# Patient Record
Sex: FEMALE | Race: WHITE | NOT HISPANIC OR LATINO | Employment: PART TIME | URBAN - METROPOLITAN AREA
[De-identification: names, ages, dates, MRNs, and addresses within clinical notes are randomized per-mention and may not be internally consistent; named-entity substitution may affect disease eponyms.]

---

## 2017-05-31 ENCOUNTER — ALLSCRIPTS OFFICE VISIT (OUTPATIENT)
Dept: OTHER | Facility: OTHER | Age: 27
End: 2017-05-31

## 2017-06-07 ENCOUNTER — ALLSCRIPTS OFFICE VISIT (OUTPATIENT)
Dept: OTHER | Facility: OTHER | Age: 27
End: 2017-06-07

## 2017-07-17 ENCOUNTER — GENERIC CONVERSION - ENCOUNTER (OUTPATIENT)
Dept: OTHER | Facility: OTHER | Age: 27
End: 2017-07-17

## 2017-07-17 DIAGNOSIS — Z23 ENCOUNTER FOR IMMUNIZATION: ICD-10-CM

## 2017-08-16 ENCOUNTER — LAB CONVERSION - ENCOUNTER (OUTPATIENT)
Dept: OTHER | Facility: OTHER | Age: 27
End: 2017-08-16

## 2017-08-16 LAB
HEPATITIS B CORE TOTAL ANTIBODY (HISTORICAL): ABNORMAL
HEPATITIS B SURFACE ANTIBODY (HISTORICAL): ABNORMAL
MUMPS IGG ANTIBODY (HISTORICAL): 65.1 AU/ML
RUBELLA, IGG (HISTORICAL): 2.98 INDEX
RUBEOLA AB, IGG (HISTORICAL): 38.7 AU/ML
VARICELLA ZOSTER IGG (HISTORICAL): 708.9 INDEX

## 2017-08-20 ENCOUNTER — GENERIC CONVERSION - ENCOUNTER (OUTPATIENT)
Dept: OTHER | Facility: OTHER | Age: 27
End: 2017-08-20

## 2017-08-25 ENCOUNTER — ALLSCRIPTS OFFICE VISIT (OUTPATIENT)
Dept: OTHER | Facility: OTHER | Age: 27
End: 2017-08-25

## 2017-09-28 ENCOUNTER — ALLSCRIPTS OFFICE VISIT (OUTPATIENT)
Dept: OTHER | Facility: OTHER | Age: 27
End: 2017-09-28

## 2017-09-28 DIAGNOSIS — Z00.00 ENCOUNTER FOR GENERAL ADULT MEDICAL EXAMINATION WITHOUT ABNORMAL FINDINGS: ICD-10-CM

## 2017-09-28 DIAGNOSIS — F98.8 OTHER SPECIFIED BEHAVIORAL AND EMOTIONAL DISORDERS WITH ONSET USUALLY OCCURRING IN CHILDHOOD AND ADOLESCENCE: ICD-10-CM

## 2017-09-28 DIAGNOSIS — R53.83 OTHER FATIGUE: ICD-10-CM

## 2017-09-28 DIAGNOSIS — R42 DIZZINESS AND GIDDINESS: ICD-10-CM

## 2017-10-30 ENCOUNTER — GENERIC CONVERSION - ENCOUNTER (OUTPATIENT)
Dept: OTHER | Facility: OTHER | Age: 27
End: 2017-10-30

## 2017-10-30 ENCOUNTER — LAB CONVERSION - ENCOUNTER (OUTPATIENT)
Dept: OTHER | Facility: OTHER | Age: 27
End: 2017-10-30

## 2017-10-30 LAB
A/G RATIO (HISTORICAL): 1.8 (CALC) (ref 1–2.5)
ALBUMIN SERPL BCP-MCNC: 4.4 G/DL (ref 3.6–5.1)
ALP SERPL-CCNC: 47 U/L (ref 33–115)
ALT SERPL W P-5'-P-CCNC: 10 U/L (ref 6–29)
AST SERPL W P-5'-P-CCNC: 13 U/L (ref 10–30)
BASOPHILS # BLD AUTO: 0.8 %
BASOPHILS # BLD AUTO: 41 CELLS/UL (ref 0–200)
BILIRUB SERPL-MCNC: 0.6 MG/DL (ref 0.2–1.2)
BUN SERPL-MCNC: 14 MG/DL (ref 7–25)
BUN/CREA RATIO (HISTORICAL): NORMAL (CALC) (ref 6–22)
CALCIUM SERPL-MCNC: 9.9 MG/DL (ref 8.6–10.2)
CHLORIDE SERPL-SCNC: 105 MMOL/L (ref 98–110)
CO2 SERPL-SCNC: 28 MMOL/L (ref 20–31)
CREAT SERPL-MCNC: 0.85 MG/DL (ref 0.5–1.1)
DEPRECATED RDW RBC AUTO: 13.3 % (ref 11–15)
EGFR AFRICAN AMERICAN (HISTORICAL): 107 ML/MIN/1.73M2
EGFR-AMERICAN CALC (HISTORICAL): 93 ML/MIN/1.73M2
EOSINOPHIL # BLD AUTO: 1 %
EOSINOPHIL # BLD AUTO: 51 CELLS/UL (ref 15–500)
GAMMA GLOBULIN (HISTORICAL): 2.5 G/DL (CALC) (ref 1.9–3.7)
GLUCOSE (HISTORICAL): 77 MG/DL (ref 65–99)
HCT VFR BLD AUTO: 38.1 % (ref 35–45)
HGB BLD-MCNC: 13 G/DL (ref 11.7–15.5)
LYME IGG/IGM AB (HISTORICAL): <0.9 INDEX
LYMPHOCYTES # BLD AUTO: 2254 CELLS/UL (ref 850–3900)
LYMPHOCYTES # BLD AUTO: 44.2 %
MCH RBC QN AUTO: 30 PG (ref 27–33)
MCHC RBC AUTO-ENTMCNC: 34.1 G/DL (ref 32–36)
MCV RBC AUTO: 87.8 FL (ref 80–100)
MONOCYTES # BLD AUTO: 367 CELLS/UL (ref 200–950)
MONOCYTES (HISTORICAL): 7.2 %
NEUTROPHILS # BLD AUTO: 2387 CELLS/UL (ref 1500–7800)
NEUTROPHILS # BLD AUTO: 46.8 %
PLATELET # BLD AUTO: 291 THOUSAND/UL (ref 140–400)
PMV BLD AUTO: 9.2 FL (ref 7.5–12.5)
POTASSIUM SERPL-SCNC: 4.4 MMOL/L (ref 3.5–5.3)
RBC # BLD AUTO: 4.34 MILLION/UL (ref 3.8–5.1)
SODIUM SERPL-SCNC: 140 MMOL/L (ref 135–146)
T4 FREE SERPL-MCNC: 1.5 NG/DL (ref 0.8–1.8)
TOTAL PROTEIN (HISTORICAL): 6.9 G/DL (ref 6.1–8.1)
TSH SERPL DL<=0.05 MIU/L-ACNC: 1.34 MIU/L
WBC # BLD AUTO: 5.1 THOUSAND/UL (ref 3.8–10.8)

## 2017-11-28 ENCOUNTER — ALLSCRIPTS OFFICE VISIT (OUTPATIENT)
Dept: OTHER | Facility: OTHER | Age: 27
End: 2017-11-28

## 2017-12-22 ENCOUNTER — ALLSCRIPTS OFFICE VISIT (OUTPATIENT)
Dept: OTHER | Facility: OTHER | Age: 27
End: 2017-12-22

## 2017-12-22 DIAGNOSIS — Z80.3 FAMILY HISTORY OF MALIGNANT NEOPLASM OF BREAST: ICD-10-CM

## 2017-12-22 DIAGNOSIS — Z12.31 ENCOUNTER FOR SCREENING MAMMOGRAM FOR MALIGNANT NEOPLASM OF BREAST: ICD-10-CM

## 2017-12-23 NOTE — PROGRESS NOTES
Assessment   1  Family history of malignant neoplasm of breast (V16 3) (Z80 3) : Family History, Paternal     Grandmother, Paternal Cousin   2  ADD (attention deficit disorder) without hyperactivity (314 00) (F98 8)    Plan   ADD (attention deficit disorder) without hyperactivity    · Amphetamine-Dextroamphet ER 20 MG Oral Capsule Extended Release 24    Hour; TAKE 2 CAPSULES DAILY  Breast cancer screening, FamHx: Family history of malignant neoplasm of breast    · * MAMMO SCREENING BILATERAL W CAD; Status:Hold For - Scheduling; Requested    for:[id number];     Discussion/Summary      - Patient is doing well on Adderall XR 20 mg 2 capsules once daily without side effects from medication  Patient given a prescription for one-month supply with postdated refills  given order for screening mammogram  There is strong family history of breast cancer  given information for gynecologist whom she saw back in 2014 to schedule appointment for gynecologic examination  The was counseled regarding risk factor reductions,-- prognosis  Possible side effects of new medications were reviewed with the patient/guardian today  The treatment plan was reviewed with the patient/guardian  The patient/guardian understands and agrees with the treatment plan      Chief Complaint   follow up on ADD    Patient is here today for follow up of chronic conditions described in HPI  History of Present Illness   Patient presents for follow-up of ADD  Patient has been taking Adderall XR 20 mg 2 capsules once daily  This has been effective for her  No side effects from medication  No weight loss, insomnia or tachycardia  She does not take this every day  Insurance will cover Vyvanse but not Adderall  Patient is willing to pack a for the prescription because it is effective and she does not want to try switching  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   Patient also questions as she does have strong family history of breast cancer at young age  She is due for gynecologic examination      Review of Systems        Constitutional: No fever, no chills, feels well, no tiredness, no recent weight gain or weight loss  Eyes: No complaints of eye pain, no red eyes, no eyesight problems, no discharge, no dry eyes, no itching of eyes  ENT: no complaints of earache, no loss of hearing, no nose bleeds, no nasal discharge, no sore throat, no hoarseness  Cardiovascular: No complaints of slow heart rate, no fast heart rate, no chest pain, no palpitations, no leg claudication, no lower extremity edema  Respiratory: No complaints of shortness of breath, no wheezing, no cough, no SOB on exertion, no orthopnea, no PND  Gastrointestinal: No complaints of abdominal pain, no constipation, no nausea or vomiting, no diarrhea, no bloody stools  Genitourinary: No complaints of dysuria, no incontinence, no pelvic pain, no dysmenorrhea, no vaginal discharge or bleeding  Musculoskeletal: No complaints of arthralgias, no myalgias, no joint swelling or stiffness, no limb pain or swelling  Integumentary: No complaints of skin rash or lesions, no itching, no skin wounds, no breast pain or lump  Neurological: No complaints of headache, no confusion, no convulsions, no numbness, no dizziness or fainting, no tingling, no limb weakness, no difficulty walking  Psychiatric: Not suicidal, no sleep disturbance, no anxiety or depression, no change in personality, no emotional problems  Endocrine: No complaints of proptosis, no hot flashes, no muscle weakness, no deepening of the voice, no feelings of weakness  Hematologic/Lymphatic: No complaints of swollen glands, no swollen glands in the neck, does not bleed easily, does not bruise easily  Active Problems   1  ADD (attention deficit disorder) without hyperactivity (314 00) (F98 8)   2  Dizziness (780 4) (R42)   3  Fatigue (780 79) (R53 83)   4  Immunity status testing (V72 61) (Z01 84)   5  Need for hepatitis B vaccination (V05 3) (Z23)   6  Need for MMR vaccine (V06 4) (Z23)   7  Need for varicella vaccine (V05 4) (Z23)   8  Screening for tuberculosis (V74 1) (Z11 1)    Past Medical History   1  History of Ankle Sprain (845 00)   2  History of Atrial premature complex (427 61) (I49 1)   3  History of Bacterial vaginosis (616 10,041 9) (N76 0,B96 89)   4  History of Breast pain (611 71) (N64 4)   5  Encounter for routine child health examination w/o abnormal findings (V20 2) (Z00 129)   6  History of acute pharyngitis (V12 69) (Z87 09)   7  History of acute pharyngitis (V12 69) (Z87 09)   8  History of long-term treatment with high-risk medication (V58 69) (Z92 29)   9  History of vaginitis (V13 29) (Z87 42)   10  History of viral gastroenteritis (V12 09) (Z86 19)   11  History of Screen for STD (sexually transmitted disease) (V74 5) (Z11 3)   12  History of Tinea versicolor (111 0) (B36 0)   13  History of Well woman exam with routine gynecological exam (V72 31) (Z01 419)     The active problems and past medical history were reviewed and updated today  Surgical History   1  Denied: History Of Prior Surgery    Family History   Mother    1  Family history of Family Health Status Of Mother - Good  Father    2  Family history of Family Health Status Of Father - Good  Paternal Grandmother    3  Family history of malignant neoplasm of breast (V16 3) (Z80 3)  Paternal Cousin    4  Family history of malignant neoplasm of breast (V16 3) (Z80 3)  Family History    5  Family history of malignant neoplasm of breast (V16 3) (Z80 3)    Social History    · Denied: History of Alcohol Use (History)   · Denied: History of Drug Use   · Exercise habits   · Never A Smoker   · Never    · No drug use  The social history was reviewed and updated today  The social history was reviewed and is unchanged  Current Meds    1   Amphetamine-Dextroamphet ER 20 MG Oral Capsule Extended Release 24 Hour; TAKE     2 CAPSULES DAILY; Therapy: [id number] to ([id number]); Last Rx:[id number] Ordered    Allergies   1  Penicillins   2  Sulfa Drugs    Vitals   Vital Signs    Recorded: 38Ehl9178 08:04AM   Heart Rate 62   Respiration 16   Systolic 636   Diastolic 62   Height 5 ft 8 in   Weight 140 lb    BMI Calculated 21 29   BSA Calculated 1 76   O2 Saturation 97     Physical Exam        Constitutional      General appearance: No acute distress, well appearing and well nourished  Cardiovascular      Auscultation of heart: Normal rate and rhythm, normal S1 and S2, without murmurs         Psychiatric      Orientation to person, place, and time: Normal        Mood and affect: Normal           Signatures    Electronically signed by : Umm Myers DO; Dec 22 2017  8:33AM EST                       (Author)

## 2018-01-09 NOTE — PROGRESS NOTES
Assessment    1  Tinea versicolor (111 0) (B36 0)   2  Screening for tuberculosis (V74 1) (Z11 1)   3  Encounter for preventive health examination (V70 0) (Z00 00)    Plan  Screening for tuberculosis    · PPD  Tinea versicolor    · Fluconazole 150 MG Oral Tablet; TAKE 1 TABLET DAILY    Discussion/Summary  health maintenance visit Currently, she eats a healthy diet and has an adequate exercise regimen  Breast cancer screening: breast cancer screening is not indicated  - Limited physical examination performed  - PPD placed which will need to be read on Friday  Patient then need to return next week for second step of PPD  -Patient was advised to contact her mother or her previous high school to see if there are records of her immunization history present  Unlikely that her pediatrician would still have retained records  - Patient given a prescription for fluconazole 150 mg daily Ã3 days for treatment of tinea versicolor  She was advised that the spots will need to blind in with the rest of her skin  - She is not presently taking her Adderall since she is not taking classes at this time  She does have postdated scripts available to be filled  Chief Complaint  Preventative visit  History of Present Illness  , Adult Female: The patient is being seen for a health maintenance evaluation  General Health: The patient's health since the last visit is described as good  She has regular dental visits  She denies vision problems  She denies hearing loss  Immunizations status: up to date  Lifestyle:  She consumes a diverse and healthy diet  She does not have any weight concerns  She exercises regularly  She does not use tobacco  She denies alcohol use  She denies drug use  Reproductive health:  she reports normal menses  Screening: cancer screening reviewed and current  metabolic screening reviewed and current  risk screening reviewed and current     HPI: 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 [employer]  Patient will need two-step PPD  She also needs titers or documentation of immunizations  We do not have any of her immunization records from when she was a child  She is uncertain if her mother would have them or if the school would have them  Presently she is not insured and if she had to have immunity titers done this will be exceedingly expensive  Patient does have some hypopigmented spots on her skin  She has had tinea versicolor in the past       Review of Systems    Constitutional: No fever, no chills, feels well, no tiredness, no recent weight gain or weight loss  Eyes: No complaints of eye pain, no red eyes, no eyesight problems, no discharge, no dry eyes, no itching of eyes  ENT: no complaints of earache, no loss of hearing, no nose bleeds, no nasal discharge, no sore throat, no hoarseness  Cardiovascular: No complaints of slow heart rate, no fast heart rate, no chest pain, no palpitations, no leg claudication, no lower extremity edema  Respiratory: No complaints of shortness of breath, no wheezing, no cough, no SOB on exertion, no orthopnea, no PND  Gastrointestinal: No complaints of abdominal pain, no constipation, no nausea or vomiting, no diarrhea, no bloody stools  Genitourinary: No complaints of dysuria, no incontinence, no pelvic pain, no dysmenorrhea, no vaginal discharge or bleeding  Musculoskeletal: No complaints of arthralgias, no myalgias, no joint swelling or stiffness, no limb pain or swelling  Integumentary: a rash, but as noted in HPI  Neurological: No complaints of headache, no confusion, no convulsions, no numbness, no dizziness or fainting, no tingling, no limb weakness, no difficulty walking  Psychiatric: Not suicidal, no sleep disturbance, no anxiety or depression, no change in personality, no emotional problems     Endocrine: No complaints of proptosis, no hot flashes, no muscle weakness, no deepening of the voice, no feelings of weakness  Hematologic/Lymphatic: No complaints of swollen glands, no swollen glands in the neck, does not bleed easily, does not bruise easily  Active Problems    1  ADD (attention deficit disorder) without hyperactivity (314 00) (F98 8)    Past Medical History    · History of Ankle Sprain (845 00)   · History of Atrial premature complex (427 61) (I49 1)   · History of Bacterial vaginosis (616 10,041 9) (N76 0,B96 89)   · History of Breast pain (611 71) (N64 4)   · Encounter for routine child health examination w/o abnormal findings (V20 2) (Z00 129)   · History of acute pharyngitis (V12 69) (Z87 09)   · History of acute pharyngitis (V12 69) (Z87 09)   · History of long-term treatment with high-risk medication (V58 69) (Z92 29)   · History of vaginitis (V13 29) (Z87 42)   · History of viral gastroenteritis (V12 09) (Z86 19)   · History of Screen for STD (sexually transmitted disease) (V74 5) (Z11 3)   · History of Well woman exam with routine gynecological exam (V72 31) (Z01 419)    Surgical History    · Denied: History Of Prior Surgery    Family History  Mother    · Family history of Family Health Status Of Mother - Good  Father    · Family history of Family Health Status Of Father - Good  Family History    · Family history of malignant neoplasm of breast (V16 3) (Z80 3)    Social History    · Denied: History of Alcohol Use (History)   · Denied: History of Drug Use   · Exercise habits   · Never A Smoker   · Never    · No drug use    Current Meds   1  Amphetamine-Dextroamphet ER 20 MG Oral Capsule Extended Release 24 Hour; TAKE   2 CAPSULES DAILY; Therapy: [id number] to (Evaluate:[id number]); Last Rx:[id number] Ordered    Allergies    1  Penicillins   2   Sulfa Drugs    Vitals   Recorded: [id number] 02:43PM   Heart Rate 72   Respiration 16   Systolic 920   Diastolic 64   Height 5 ft 8 in   Weight 136 lb    BMI Calculated 20 68   BSA Calculated 1 73     Physical Exam    Constitutional   General appearance: No acute distress, well appearing and well nourished  Eyes   Conjunctiva and lids: No swelling, erythema or discharge  Pupils and irises: Equal, round and reactive to light  Ears, Nose, Mouth, and Throat   External inspection of ears and nose: Normal     Otoscopic examination: Tympanic membranes translucent with normal light reflex  Canals patent without erythema  Oropharynx: Normal with no erythema, edema, exudate or lesions  Pulmonary   Respiratory effort: No increased work of breathing or signs of respiratory distress  Auscultation of lungs: Clear to auscultation  Cardiovascular   Palpation of heart: Normal PMI, no thrills  Auscultation of heart: Normal rate and rhythm, normal S1 and S2, without murmurs  Examination of extremities for edema and/or varicosities: Normal     Abdomen   Abdomen: Non-tender, no masses  Liver and spleen: No hepatomegaly or splenomegaly  Lymphatic   Palpation of lymph nodes in neck: No lymphadenopathy  Musculoskeletal   Gait and station: Normal     Digits and nails: Normal without clubbing or cyanosis  Inspection/palpation of joints, bones, and muscles: Normal     Skin   Skin and subcutaneous tissue: Normal without rashes or lesions  Examination of the skin for lesions: Abnormal   Very small circular hypopigmented areas on forearms and torso consistent with tinea versicolor  Neurologic   Cranial nerves: Cranial nerves 2-12 intact  Reflexes: 2+ and symmetric  Sensation: No sensory loss      Psychiatric   Orientation to person, place, and time: Normal     Mood and affect: Normal        Procedure    Procedure:   Results: 20/25 in the right eye without corrective device, 20/20 in the left eye without corrective device      Signatures   Electronically signed by : Remedios Nice DO; Jun 1 2017 12:30PM EST                       (Author)

## 2018-01-11 NOTE — PROGRESS NOTES
Assessment    1  ADD (attention deficit disorder) without hyperactivity (314 00) (F90 0)    Plan  ADD (attention deficit disorder) without hyperactivity    · Vyvanse 30 MG Oral Capsule; TAKE 1 CAPSULE DAILY IN THE MORNING    Discussion/Summary    - Patient will be started on formulary preferred drug which is Vyvanse 30 mg once daily  She will need gradual titration to ideal dosage of medication  She was warned of side effects medication  She is to take the medication first thing in the morning  Dosage is usually increased by 10 or 20 mg increments   -Patient does have a high co-pay  I would like her to call on Monday or Tuesday of next week letting me know how well the medication is working, if the dosage is adequate, any side effects and the cost of the medication   -We will make adjustments via telephone if medication is effective   -Follow-up in 2-3 months  Chief Complaint  follow up on ADD      History of Present Illness  Patient presents for follow-up of ADD  Patient has been taking Adderall XR 20 mg once daily  She states that she'll take this approximate 8 morning and it seems to wear off around 2 PM  No side effects from medication  Patient states that the medication was quite expensive costing $350 for the month supply  This was full cash cost  Insurance did not cover medication  Preferred medication on insurance website is Vyvanse      Review of Systems    Constitutional: No fever, no chills, feels well, no tiredness, no recent weight gain or weight loss  Eyes: No complaints of eye pain, no red eyes, no eyesight problems, no discharge, no dry eyes, no itching of eyes  ENT: no complaints of earache, no loss of hearing, no nose bleeds, no nasal discharge, no sore throat, no hoarseness  Cardiovascular: No complaints of slow heart rate, no fast heart rate, no chest pain, no palpitations, no leg claudication, no lower extremity edema     Respiratory: No complaints of shortness of breath, no wheezing, no cough, no SOB on exertion, no orthopnea, no PND  Gastrointestinal: No complaints of abdominal pain, no constipation, no nausea or vomiting, no diarrhea, no bloody stools  Genitourinary: No complaints of dysuria, no incontinence, no pelvic pain, no dysmenorrhea, no vaginal discharge or bleeding  Musculoskeletal: No complaints of arthralgias, no myalgias, no joint swelling or stiffness, no limb pain or swelling  Integumentary: No complaints of skin rash or lesions, no itching, no skin wounds, no breast pain or lump  Neurological: No complaints of headache, no confusion, no convulsions, no numbness, no dizziness or fainting, no tingling, no limb weakness, no difficulty walking  Psychiatric: Not suicidal, no sleep disturbance, no anxiety or depression, no change in personality, no emotional problems  Endocrine: No complaints of proptosis, no hot flashes, no muscle weakness, no deepening of the voice, no feelings of weakness  Hematologic/Lymphatic: No complaints of swollen glands, no swollen glands in the neck, does not bleed easily, does not bruise easily  Active Problems    1  ADD (attention deficit disorder) without hyperactivity (314 00) (F90 0)    Past Medical History    1  History of Ankle Sprain (845 00)   2  History of Atrial premature complex (427 61) (I49 1)   3  History of Bacterial vaginosis (616 10,041 9) (N76 0,B96 89)   4  History of Breast pain (611 71) (N64 4)   5  Encounter for routine child health examination w/o abnormal findings (V20 2) (Z00 129)   6  History of acute pharyngitis (V12 69) (Z87 09)   7  History of acute pharyngitis (V12 69) (Z87 09)   8  History of long-term treatment with high-risk medication (V58 69) (Z92 29)   9  History of vaginitis (V13 29) (Z87 42)   10  History of viral gastroenteritis (V12 09) (Z86 19)   11  History of Screen for STD (sexually transmitted disease) (V74 5) (Z11 3)   12  History of Tinea versicolor (111 0) (B36 0)   13   History of Well woman exam with routine gynecological exam (V72 31) (Z01 419)    The active problems and past medical history were reviewed and updated today  Surgical History    1  Denied: History Of Prior Surgery    Family History  Mother    1  Family history of Family Health Status Of Mother - Good  Father    2  Family history of Family Health Status Of Father - Good  Family History    3  Family history of malignant neoplasm of breast (V16 3) (Z80 3)    Social History    · Denied: History of Alcohol Use (History)   · Denied: History of Drug Use   · Exercise habits   · Never A Smoker   · Never    · No drug use  The social history was reviewed and updated today  The social history was reviewed and is unchanged  Allergies    1  Penicillins   2  Sulfa Drugs    Vitals  Vital Signs [Data Includes: Current Encounter]    Recorded: 29Apr2016 07:59AM   Heart Rate 64   Respiration 16   Systolic 120   Diastolic 60   Height 5 ft 8 in   Weight 132 lb    BMI Calculated 20 07   BSA Calculated 1 72   O2 Saturation 98     Physical Exam    Constitutional   General appearance: No acute distress, well appearing and well nourished  Cardiovascular   Auscultation of heart: Normal rate and rhythm, normal S1 and S2, without murmurs  Psychiatric   Orientation to person, place, and time: Normal     Mood and affect: Normal          Signatures   Electronically signed by : Dayday Rubio DO;  Apr 29 2016  8:28AM EST                       (Author)

## 2018-01-12 NOTE — PROGRESS NOTES
Chief Complaint  Patient presented for 2nd Hep B      Active Problems    1  ADD (attention deficit disorder) without hyperactivity (314 00) (F98 8)   2  Dizziness (780 4) (R42)   3  Fatigue (780 79) (R53 83)   4  Immunity status testing (V72 61) (Z01 84)   5  Need for hepatitis B vaccination (V05 3) (Z23)   6  Need for MMR vaccine (V06 4) (Z23)   7  Need for varicella vaccine (V05 4) (Z23)   8  Screening for tuberculosis (V74 1) (Z11 1)    Current Meds   1  Amphetamine-Dextroamphet ER 20 MG Oral Capsule Extended Release 24 Hour; TAKE   2 CAPSULES DAILY; Therapy: [id number] to (99 [id number]); Last Rx:[id number] Ordered    Allergies    1  Penicillins   2   Sulfa Drugs    Plan  Need for hepatitis B vaccination    · Hepatitis B    Signatures   Electronically signed by : Amber Solomon, ; Nov 28 2017  9:55AM EST                       (Author)    Electronically signed by : Sakshi Velásquez DO; Nov 28 2017 11:17AM EST                       (Author)

## 2018-01-13 VITALS
BODY MASS INDEX: 20.61 KG/M2 | SYSTOLIC BLOOD PRESSURE: 112 MMHG | HEART RATE: 80 BPM | DIASTOLIC BLOOD PRESSURE: 64 MMHG | WEIGHT: 136 LBS | HEIGHT: 68 IN | TEMPERATURE: 98.2 F

## 2018-01-13 VITALS
RESPIRATION RATE: 16 BRPM | HEIGHT: 68 IN | SYSTOLIC BLOOD PRESSURE: 108 MMHG | DIASTOLIC BLOOD PRESSURE: 64 MMHG | HEART RATE: 72 BPM | BODY MASS INDEX: 20.61 KG/M2 | WEIGHT: 136 LBS

## 2018-01-14 NOTE — RESULT NOTES
Verified Results  (Q) HEPATITIS B IMMUNITY PANEL [id number] 12:08PM [facility]     Test Name Result Flag Reference   HEPATITIS B CORE AB TOTAL NON-REACTIVE  NON-REACTIVE   HEPATITIS B SURFACE ANTIBODY QL BORDERLINE A NON-REACTIVE   Verified by repeat analysis  MMR (IGG) PANEL (MEASLES, MUMPS, RUBELLA) [id number] 12:08PM [facility]     Test Name Result Flag Reference   MEASLES ANTIBODY (IGG) 38 70 AU/mL     AU/mL            Interpretation  -----            --------------  <25 00           Negative  25 00-29 99      Equivocal  >29 99           Positive     A positive result indicates that the patient has  antibody to measles virus  It does not differentiate   between an active or past infection  The clinical   diagnosis must be interpreted in conjunction with   clinical signs and symptoms of the patient  MUMPS VIRUS$ANTIBODY (IGG) 65 10 AU/mL     AU/mL           Interpretation  -------         ----------------  <9 00             Negative  9 00-10 99        Equivocal  >10 99            Positive  A positive result indicates that the patient has   antibody to mumps virus  It does not differentiate  between an  active or past infection  The clinical  diagnosis must be interpreted in conjunction with  clinical signs and symptoms of the patient  RUBELLA ANTIBODY (IGG) 2 98 index     Index            Interpretation      -----            --------------        <0 90            Not consistent with Immunity      0 90-0 99        Equivocal      > or = 1 00      Consistent with Immunity      The presence of rubella IgG antibody suggests   immunization or past or current infection with  rubella virus       (Q) VARICELLA ZOSTER VIRUS AB (IGG) [id number] 12:08PM [facility]   REPORT COMMENT:  FASTING:NO     Test Name Result Flag Reference   VARICELLA ZOSTER VIRUS$AB (IGG) 708 90 index     Index               Interpretation       ---------         ----------------------      <135 00            Negative - Antibody not detected      135 00 - 164 99    Equivocal      > or = 165 00      Positive - Antibody detected         A positive result indicates that the patient      has antibody to VZV but does not differentiate      between an active or past infection  The clinical diagnosis must be interpreted in       conjunction with the clinical signs and symptoms of       the patient  This assay reliably measures immunity      due to previous infection but may not be       sensitive enough to detect antibodies induced by      vaccination  Thus, a negative result in a vaccinated      individual does not necessarily indicate      susceptibility to VZV infection

## 2018-01-14 NOTE — PROGRESS NOTES
Chief Complaint  PT PRESENTS FOR HEP B VACCINE #1, TOLERATED WELL      Active Problems    1  ADD (attention deficit disorder) without hyperactivity (314 00) (F98 8)   2  Immunity status testing (V72 61) (Z01 84)   3  Need for hepatitis B vaccination (V05 3) (Z23)   4  Need for MMR vaccine (V06 4) (Z23)   5  Need for varicella vaccine (V05 4) (Z23)   6  Screening for tuberculosis (V74 1) (Z11 1)   7  Tinea versicolor (111 0) (B36 0)    Current Meds   1  Amphetamine-Dextroamphet ER 20 MG Oral Capsule Extended Release 24 Hour; TAKE   2 CAPSULES DAILY; Therapy: 07EZH4410 to (Evaluate:41Rug2834); Last Rx:[id number] Ordered   2  Fluconazole 150 MG Oral Tablet; TAKE 1 TABLET DAILY; Therapy: 29TJU7441 to (Evaluate:03Jun2017)  Requested for: 61IDT1265; Last   Rx:[id number] Ordered    Allergies    1  Penicillins   2  Sulfa Drugs    Future Appointments    Date/Time Provider Specialty Site   10/06/2017 11:00 AM Cornel CROWE, Nurse Schedule  FAMILY [facility]     Signatures   Electronically signed by :  Jordi Orozco, ; Aug 25 2017 11:01AM EST                       (Author)    Electronically signed by : Ana Martínez DO; Aug 25 2017 12:42PM EST                       (Author)

## 2018-01-16 NOTE — MISCELLANEOUS
Message   Recorded as Task   Date: 05/02/2016 04:59 PM, Created By: Omar Beavers   Task Name: Med Renewal Request   Assigned To: Seth Hall   Regarding Patient: Jayden Springer, Status: Active   Comment:    Kimberlee Krishnamurthy - 02 May 2016 4:59 PM     TASK CREATED  Caller: Self; Other; [phone] (Home)  PT CALLED REQUESTING TO GO BACK ON HER OLD MEDICATION SINCE THE OTHER MEDICATION IS TO EXPENSIVE  SHE WOULD LIKE AMPHETAMINE 20MG  SHE WILL  WHEN READY   Seth Hall - 02 May 2016 8:06 PM     TASK EDITED  Prescription for generic Adderall XR 20 mg reprinted for patient  She is essentially pain cash for this prescription which comes out to being approximately $215  Her insurance has very high deductible for prescription co-pays  Vyvanse which is brand preferred was more expensive than Adderall  I did mention to the patient may be giving a trial to generic methylphenidate ER 40 mg which would be significantly cheaper than the Adderall   Ten-day supply of methylphenidate ER printed as well and available for         Signatures   Electronically signed by : Jadyn Pruett DO; May  2 2016  8:06PM EST                       (Author)

## 2018-01-18 NOTE — PROGRESS NOTES
Chief Complaint  Patient presented for PPD      Active Problems    1  ADD (attention deficit disorder) without hyperactivity (314 00) (F98 8)   2  Screening for tuberculosis (V74 1) (Z11 1)   3  Tinea versicolor (111 0) (B36 0)    Current Meds   1  Amphetamine-Dextroamphet ER 20 MG Oral Capsule Extended Release 24 Hour; TAKE   2 CAPSULES DAILY; Therapy: 51XVO5705 to (Evaluate:01Dec2016); Last Rx:01Nov2016 Ordered   2  Fluconazole 150 MG Oral Tablet; TAKE 1 TABLET DAILY; Therapy: 91IFX7686 to (Evaluate:03Jun2017)  Requested for: 37CQU4036; Last   Rx:[id number] Ordered    Allergies    1  Penicillins   2   Sulfa Drugs    Plan  Screening for tuberculosis    · PPD    Signatures   Electronically signed by : Stacy Chang, ; Jun 7 2017 10:04AM EST                       (Author)    Electronically signed by : Spencer Rivera DO; Jun 7 2017  5:13PM EST                       (Author)

## 2018-01-18 NOTE — RESULT NOTES
Discussion/Summary   Completely normal labs including blood count, electrolytes, liver functions, thyroid function  Negative Lyme serology  No laboratory explanation for your fatigue  The only other reasonable explanation is because you are a student  Try to get more sleep and enjoy daylight savings time     Verified Results  (1) CBC/PLT/DIFF 27Oct2017 11:38AM Surendra Buysight     Test Name Result Flag Reference   WHITE BLOOD CELL COUNT 5 1 Thousand/uL  3 8-10 8   RED BLOOD CELL COUNT 4 34 Million/uL  3 80-5 10   HEMOGLOBIN 13 0 g/dL  11 7-15 5   HEMATOCRIT 38 1 %  35 0-45 0   MCV 87 8 fL  80 0-100 0   MCH 30 0 pg  27 0-33 0   MCHC 34 1 g/dL  32 0-36 0   RDW 13 3 %  11 0-15 0   PLATELET COUNT 935 Thousand/uL  140-400   ABSOLUTE NEUTROPHILS 2387 cells/uL  5153-6440   ABSOLUTE LYMPHOCYTES 2254 cells/uL  850-3900   ABSOLUTE MONOCYTES 367 cells/uL  200-950   ABSOLUTE EOSINOPHILS 51 cells/uL     ABSOLUTE BASOPHILS 41 cells/uL  0-200   NEUTROPHILS 46 8 %     LYMPHOCYTES 44 2 %     MONOCYTES 7 2 %     EOSINOPHILS 1 0 %     BASOPHILS 0 8 %     MPV 9 2 fL  7 5-12 5     (1) COMPREHENSIVE METABOLIC PANEL 15UVG0710 54:84JM CHAINelsanastasia Buysight     Test Name Result Flag Reference   GLUCOSE 77 mg/dL  65-99   Fasting reference interval   UREA NITROGEN (BUN) 14 mg/dL  7-25   CREATININE 0 85 mg/dL  0 50-1 10   eGFR NON-AFR   AMERICAN 93 mL/min/1 73m2  > OR = 60   eGFR AFRICAN AMERICAN 107 mL/min/1 73m2  > OR = 60   BUN/CREATININE RATIO   9-19   NOT APPLICABLE (calc)   SODIUM 140 mmol/L  135-146   POTASSIUM 4 4 mmol/L  3 5-5 3   CHLORIDE 105 mmol/L     CARBON DIOXIDE 28 mmol/L  20-31   CALCIUM 9 9 mg/dL  8 6-10 2   PROTEIN, TOTAL 6 9 g/dL  6 1-8 1   ALBUMIN 4 4 g/dL  3 6-5 1   GLOBULIN 2 5 g/dL (calc)  1 9-3 7   ALBUMIN/GLOBULIN RATIO 1 8 (calc)  1 0-2 5   BILIRUBIN, TOTAL 0 6 mg/dL  0 2-1 2   ALKALINE PHOSPHATASE 47 U/L     AST 13 U/L  10-30   ALT 10 U/L  6-29     (1) T4, FREE 27Oct2017 11:38AM Adelene Schlossman     Test Name Result Flag Reference   T4, FREE 1 5 ng/dL  0 8-1 8     (Q) LYME DISEASE AB, TOTAL W/REFL WB (IGG, IGM) 27Oct2017 11:38AM Chilo Funes     Test Name Result Flag Reference   LYME AB SCREEN <0 90 index     Index                Interpretation                     -----                --------------                     < 0 90               Negative                     0  90-1 09            Equivocal                     > 1 09               Positive      As recommended by the Food and Drug Administration   (FDA), all samples with positive or equivocal   results in a Borrelia burgdorferi antibody screen  will be tested using a blot method  Positive or   equivocal screening test results should not be   interpreted as truly positive until verified as such   using a supplemental assay (e g , B  burgdorferi blot)  The screening test and/or blot for B  burgdorferi   antibodies may be falsely negative in early stages  of Lyme disease, including the period when erythema   migrans is apparent       (Q) TSH, 3RD GENERATION 27Oct2017 11:38AM Chilo Funes   REPORT COMMENT:  FASTING:NO     Test Name Result Flag Reference   TSH 1 34 mIU/L     Reference Range                         > or = 20 Years  0 40-4 50                              Pregnancy Ranges            First trimester    0 26-2 66            Second trimester   0 55-2 73            Third trimester    0 43-2 91

## 2018-01-23 VITALS
SYSTOLIC BLOOD PRESSURE: 110 MMHG | BODY MASS INDEX: 21.22 KG/M2 | WEIGHT: 140 LBS | HEART RATE: 62 BPM | OXYGEN SATURATION: 97 % | HEIGHT: 68 IN | RESPIRATION RATE: 16 BRPM | DIASTOLIC BLOOD PRESSURE: 62 MMHG

## 2018-02-26 ENCOUNTER — CLINICAL SUPPORT (OUTPATIENT)
Dept: FAMILY MEDICINE CLINIC | Facility: CLINIC | Age: 28
End: 2018-02-26
Payer: COMMERCIAL

## 2018-02-26 DIAGNOSIS — Z23 NEED FOR VACCINATION: Primary | ICD-10-CM

## 2018-02-26 PROCEDURE — 90746 HEPB VACCINE 3 DOSE ADULT IM: CPT

## 2018-02-26 PROCEDURE — 90471 IMMUNIZATION ADMIN: CPT

## 2018-02-26 RX ORDER — DEXTROAMPHETAMINE SACCHARATE, AMPHETAMINE ASPARTATE MONOHYDRATE, DEXTROAMPHETAMINE SULFATE AND AMPHETAMINE SULFATE 5; 5; 5; 5 MG/1; MG/1; MG/1; MG/1
2 CAPSULE, EXTENDED RELEASE ORAL DAILY
COMMUNITY
Start: 2013-05-09 | End: 2018-08-07 | Stop reason: SDUPTHER

## 2018-04-11 ENCOUNTER — TELEPHONE (OUTPATIENT)
Dept: FAMILY MEDICINE CLINIC | Facility: CLINIC | Age: 28
End: 2018-04-11

## 2018-04-11 DIAGNOSIS — Z01.84 IMMUNITY STATUS TESTING: Primary | ICD-10-CM

## 2018-04-11 NOTE — TELEPHONE ENCOUNTER
ANETA FINISHED HER HEP B SERIES IN FEB  SHE NOW NEEDS AN ORDER TO HAVE BLOOD WORK DONE TO CHECK IMMUNITY LEVELS TO HEP B  PLEASE CAN WHEN ORDER IS READY

## 2018-04-25 LAB
HBV CORE AB SERPL QL IA: ABNORMAL
HBV SURFACE AB SER QL IA: REACTIVE

## 2018-05-08 ENCOUNTER — TELEPHONE (OUTPATIENT)
Dept: FAMILY MEDICINE CLINIC | Facility: CLINIC | Age: 28
End: 2018-05-08

## 2018-05-08 NOTE — TELEPHONE ENCOUNTER
It appears that a Hep B immunity panel was ordered and it shows Hep B antibody Ql  Will she need another lab order?

## 2018-05-08 NOTE — TELEPHONE ENCOUNTER
Patient said she recently had some testing done and she sent the results to her school nurse for her Hep B-she said the nurse sent them back to her and said she needs the results should be Quantative  Can we please call her back to discuss

## 2018-05-09 DIAGNOSIS — Z01.84 IMMUNITY STATUS TESTING: Primary | ICD-10-CM

## 2018-05-09 NOTE — TELEPHONE ENCOUNTER
So that was a qualitative test that shows that she is immune    Order generated for quantitative test

## 2018-05-15 LAB
HBV SURFACE AB SER QL IA: REACTIVE
HBV SURFACE AB SERPL IA-ACNC: >1000 MIU/ML
HCV AB S/CO SERPL IA: 0.02
HCV AB SERPL QL IA: NORMAL
REF LAB TEST NAME: NORMAL
REF LAB TEST: NORMAL
SL AMB CLIENT CONTACT: NORMAL

## 2018-05-21 ENCOUNTER — ANNUAL EXAM (OUTPATIENT)
Dept: OBGYN CLINIC | Facility: CLINIC | Age: 28
End: 2018-05-21
Payer: COMMERCIAL

## 2018-05-21 VITALS
SYSTOLIC BLOOD PRESSURE: 117 MMHG | WEIGHT: 132 LBS | DIASTOLIC BLOOD PRESSURE: 82 MMHG | HEIGHT: 67 IN | BODY MASS INDEX: 20.72 KG/M2

## 2018-05-21 DIAGNOSIS — B96.89 BACTERIAL VAGINOSIS: ICD-10-CM

## 2018-05-21 DIAGNOSIS — Z12.4 ENCOUNTER FOR SCREENING FOR MALIGNANT NEOPLASM OF CERVIX: ICD-10-CM

## 2018-05-21 DIAGNOSIS — Z01.419 WELL FEMALE EXAM WITH ROUTINE GYNECOLOGICAL EXAM: Primary | ICD-10-CM

## 2018-05-21 DIAGNOSIS — N76.0 BACTERIAL VAGINOSIS: ICD-10-CM

## 2018-05-21 PROCEDURE — G0145 SCR C/V CYTO,THINLAYER,RESCR: HCPCS | Performed by: OBSTETRICS & GYNECOLOGY

## 2018-05-21 PROCEDURE — 99395 PREV VISIT EST AGE 18-39: CPT | Performed by: OBSTETRICS & GYNECOLOGY

## 2018-05-21 RX ORDER — METRONIDAZOLE 500 MG/1
500 TABLET ORAL 2 TIMES DAILY
Qty: 14 TABLET | Refills: 0 | Status: SHIPPED | OUTPATIENT
Start: 2018-05-21 | End: 2018-05-28

## 2018-05-21 NOTE — PROGRESS NOTES
ASSESSMENT & PLAN: Stepan Rodriguez is a 32 y o  Di Horseman with normal gynecologic exam     1   Routine well woman exam done today  2    Pap and HPV:Pap with reflex HPV was done today  Current ASCCP Guidelines reviewed  Last Pap   :  no abnormalities  3   The patient declined STD testing  No testing performed  Safe sex practices have been discussed  4  The patient is sexually active  She agreeed to continue current contraception and options have been discussed  5  The following were reviewed in today's visit: breast self exam, use and side effects of OCPs, family planning choices, exercise and healthy diet  6  Patient to return to office in 1 months for nexplanon removal  Considering OCPS after nexplanon removed as she is considering pregnancy in the next year  Recommend folic acid daily  7  Bacterial vaginosis - rx sent to pharmacy for treatment  All questions have been answered to her satisfaction  CC:  Annual Gynecologic Examination    HPI: Stepan Rodriguez is a 32 y o  Di Horseman who presents for annual gynecologic examination  She has the following concerns:  Irregular bleeding with Nexplanon  Not sure what she wants to use next  Considering lo dose OCPs  Thinks she might have BV - slight discharge, mild odor, odor worse after intercourse  Health Maintenance:    She exercises 3 days per week  She wears her seatbelt routinely  She does perform regular monthly self breast exams  She feels safe at home  Patients does follow a balanced diet  Past Medical History:   Diagnosis Date    H/O long-term treatment with high-risk medication        No past surgical history on file  Past OB/Gyn History:  Period Pattern: (!) Irregular  Menstrual Flow: Light  Dysmenorrhea: NonePatient's last menstrual period was 2018 (exact date)       Menstrual History:  OB History      Para Term  AB Living    1       1      SAB TAB Ectopic Multiple Live Births Patient's last menstrual period was 05/20/2018 (exact date)  Period Pattern: (!) Irregular  Menstrual Flow: Light  Dysmenorrhea: None    History of sexually transmitted infection - chlamydia at 15yo  Patient is currently sexually active  heterosexual Birth control: Nexplanon since 2015  Last Pap  2014 :  no abnormalities  Family History   Problem Relation Age of Onset    Breast cancer Paternal [de-identified]     Breast cancer Cousin      Paternal Cousin       Social History:  Social History     Social History    Marital status: Single     Spouse name: N/A    Number of children: N/A    Years of education: N/A     Occupational History    Not on file  Social History Main Topics    Smoking status: Never Smoker    Smokeless tobacco: Never Used    Alcohol use Yes      Comment: socially    Drug use: No    Sexual activity: Yes     Partners: Male     Birth control/ protection: Implant     Other Topics Concern    Not on file     Social History Narrative    Exercise habits     Presently lives with boyfriend  Patient is   Patient is currently employed, 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  Allergies   Allergen Reactions    Penicillins      Reaction Date: [id number];     Sulfa Antibiotics      Reaction Date: 02Sep2008;        Current Outpatient Prescriptions:     amphetamine-dextroamphetamine (ADDERALL XR) 20 MG 24 hr capsule, Take 2 capsules by mouth daily, Disp: , Rfl:     Etonogestrel (NEXPLANON) 76 MG IMPL, Inject under the skin, Disp: , Rfl:     Review of Systems:  A complete review of systems was performed and was negative, except as listed  Denies weight changes, excessive fatigue, urinary incontinence, urinary frequency, constipation or bowel changes, blood in stool, severe headaches  Physical Exam:  /82   Ht 5' 7" (1 702 m)   Wt 59 9 kg (132 lb)   LMP 05/20/2018 (Exact Date)   Breastfeeding?  No   BMI 20 67 kg/m²    GEN: The patient was alert and oriented x3, pleasant well-appearing female in no acute distress  HEENT:  Unremarkable, no anterior or posterior lymphadenopathy, no thyromegaly  CV:  RRR, no murmurs  RESP:  Clear to auscultation bilaterally  BREAST:  Symmetric breasts with no palpable breast masses or obvious breast lesions  She has no retractions or nipple discharge  She has no axillary abnormalities or palpable masses  Self breast exam is taught  ABD:  Soft, nontender, non-distended  EXT: nontender, no edema  PELVIC:  Normal appearing external female genitalia, normal vaginal epithelium, bloody  discharge  Cervix present , no lesions  Bimanual: absent CMT,  normal uterus, non-tender  No palpable adnexal masses  Pap was collected

## 2018-05-24 LAB
LAB AP GYN PRIMARY INTERPRETATION: NORMAL
Lab: NORMAL

## 2018-08-07 ENCOUNTER — OFFICE VISIT (OUTPATIENT)
Dept: FAMILY MEDICINE CLINIC | Facility: CLINIC | Age: 28
End: 2018-08-07
Payer: COMMERCIAL

## 2018-08-07 ENCOUNTER — APPOINTMENT (OUTPATIENT)
Dept: LAB | Facility: CLINIC | Age: 28
End: 2018-08-07
Payer: COMMERCIAL

## 2018-08-07 VITALS
TEMPERATURE: 97.5 F | WEIGHT: 140 LBS | RESPIRATION RATE: 18 BRPM | SYSTOLIC BLOOD PRESSURE: 118 MMHG | OXYGEN SATURATION: 98 % | HEART RATE: 82 BPM | BODY MASS INDEX: 21.22 KG/M2 | DIASTOLIC BLOOD PRESSURE: 72 MMHG | HEIGHT: 68 IN

## 2018-08-07 DIAGNOSIS — Z00.00 PHYSICAL EXAM, ANNUAL: Primary | ICD-10-CM

## 2018-08-07 DIAGNOSIS — F98.8 ADD (ATTENTION DEFICIT DISORDER) WITHOUT HYPERACTIVITY: ICD-10-CM

## 2018-08-07 DIAGNOSIS — Z11.1 SCREENING FOR TUBERCULOSIS: ICD-10-CM

## 2018-08-07 PROCEDURE — 99395 PREV VISIT EST AGE 18-39: CPT | Performed by: FAMILY MEDICINE

## 2018-08-07 PROCEDURE — 86480 TB TEST CELL IMMUN MEASURE: CPT

## 2018-08-07 PROCEDURE — 36415 COLL VENOUS BLD VENIPUNCTURE: CPT

## 2018-08-07 PROCEDURE — 99213 OFFICE O/P EST LOW 20 MIN: CPT | Performed by: FAMILY MEDICINE

## 2018-08-07 PROCEDURE — 1036F TOBACCO NON-USER: CPT | Performed by: FAMILY MEDICINE

## 2018-08-07 PROCEDURE — 3008F BODY MASS INDEX DOCD: CPT | Performed by: FAMILY MEDICINE

## 2018-08-07 RX ORDER — DEXTROAMPHETAMINE SACCHARATE, AMPHETAMINE ASPARTATE MONOHYDRATE, DEXTROAMPHETAMINE SULFATE AND AMPHETAMINE SULFATE 5; 5; 5; 5 MG/1; MG/1; MG/1; MG/1
40 CAPSULE, EXTENDED RELEASE ORAL EVERY MORNING
Qty: 60 CAPSULE | Refills: 0 | Status: SHIPPED | OUTPATIENT
Start: 2018-08-07 | End: 2018-09-10 | Stop reason: SDUPTHER

## 2018-08-07 NOTE — ASSESSMENT & PLAN NOTE
Continue on Adderall XR  40 mg once daily  No side effects from medication  Patient has been on this for several years with good benefit  This will require prior authorization through her insurance  Prior to prescribing the controlled substance, a patient search was performed on the [place] prescription drug monitoring program web site  There was no evidence of diversion or misuse    Prescription provided

## 2018-08-07 NOTE — PROGRESS NOTES
Subjective:      Patient ID: Cristofer Osborne is a 32 y o  female  Generally healthy 17-year-old female presents for annual physical examination  Patient is current with screening gynecological examination which was performed in May  She does have implantable birth control  Patient does have history of ADD  She is on Adderall XR 20 mg once daily  She is presently 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  Even though the patient had 2 step PPD last year she needs a no other tuberculosis screening  Within 3 months of starting clinical rotations  She had normal screening labs  In October of last year  Patient feels well  She did not take her Adderall during the summer  She only had 1 summer class and was able to study for this with some difficulty  When she gets in to the school year starting at the end of this month she will then have 6 classes including pharmacology  She would like to resume taking the Adderall at that time  Past Medical History:   Diagnosis Date    H/O long-term treatment with high-risk medication        Family History   Problem Relation Age of Onset    Breast cancer Paternal Grandmother     Breast cancer Cousin         Paternal Cousin       No past surgical history on file  reports that she has never smoked  She has never used smokeless tobacco  She reports that she drinks alcohol  She reports that she does not use drugs  Current Outpatient Prescriptions:     amphetamine-dextroamphetamine (ADDERALL XR) 20 MG 24 hr capsule, Take 2 capsules by mouth daily, Disp: , Rfl:     Etonogestrel (NEXPLANON) 76 MG IMPL, Inject under the skin, Disp: , Rfl:     The following portions of the patient's history were reviewed and updated as appropriate: allergies, current medications, past family history, past medical history, past social history, past surgical history and problem list     Review of Systems   Constitutional: Negative  HENT: Negative  Eyes: Negative      Respiratory: Negative  Cardiovascular: Negative  Gastrointestinal: Negative  Endocrine: Negative  Genitourinary: Negative  Musculoskeletal: Negative  Skin: Negative  Allergic/Immunologic: Negative  Neurological: Negative  Hematological: Negative  Psychiatric/Behavioral: Negative  Objective:    /72   Pulse 82   Temp 97 5 °F (36 4 °C)   Resp 18   Ht 5' 8" (1 727 m)   Wt 63 5 kg (140 lb)   SpO2 98%   BMI 21 29 kg/m²      Physical Exam   Constitutional: She is oriented to person, place, and time  She appears well-developed and well-nourished  HENT:   Head: Normocephalic and atraumatic  Eyes: Conjunctivae are normal  Pupils are equal, round, and reactive to light  Neck: Normal range of motion  Neck supple  Cardiovascular: Normal rate and regular rhythm  Pulmonary/Chest: Effort normal and breath sounds normal    Abdominal: Soft  Bowel sounds are normal    Neurological: She is alert and oriented to person, place, and time  She has normal reflexes  Psychiatric: She has a normal mood and affect  Her behavior is normal  Judgment and thought content normal    Nursing note and vitals reviewed        Recent Results (from the past 8736 hour(s))   Hepatitis B Immunity Panel (Historical)    Collection Time: 08/15/17 12:08 PM   Result Value Ref Range    HEPATITIS B CORE TOTAL ANTIBODY NON-REACTIVE NON-REACTIVE    HEPATITIS B SURFACE ANTIBODY BORDERLINE (A) NON-REACTIVE   MEASLES, MUMPS, RUBELLA (MMR) IMMUNITY PROFILE (HISTORICAL)    Collection Time: 08/15/17 12:08 PM   Result Value Ref Range    Rubeola AB, IgG 38 70 AU/mL    MUMPS IGG ANTIBODY 65 10 AU/mL    RUBELLA, IGG 2 98 index   VARICELLA ZOSTER ANTIBODY, IGG (HISTORICAL)    Collection Time: 08/15/17 12:08 PM   Result Value Ref Range    VARICELLA ZOSTER  90 index   LYME AB PROFILE W/REFLEX TO WB (HISTORICAL)    Collection Time: 10/27/17 11:38 AM   Result Value Ref Range    Lyme IgG/IgM Ab <0 90 index   TSH, 3RD GENERATION (HISTORICAL)    Collection Time: 10/27/17 11:38 AM   Result Value Ref Range    TSH 3RD GENERATON 1 34 mIU/L   COMPREHENSIVE METABOLIC PANEL (HISTORICAL)    Collection Time: 10/27/17 11:38 AM   Result Value Ref Range    Glucose 77 65 - 99 mg/dL    BUN 14 7 - 25 mg/dL    Creatinine 0 85 0 50 - 1 10 mg/dL    EGFR-AMERICAN CALC 93 > OR = 60 mL/min/1 73m2    eGFR  107 > OR = 60 mL/min/1 73m2    BUN/CREA Ratio NOT APPLICABLE 6 - 22 (calc)    Sodium 140 135 - 146 mmol/L    Potassium 4 4 3 5 - 5 3 mmol/L    Chloride 105 98 - 110 mmol/L    CO2 28 20 - 31 mmol/L    Calcium 9 9 8 6 - 10 2 mg/dL    Total Protein 6 9 6 1 - 8 1 g/dL    Albumin 4 4 3 6 - 5 1 g/dL    GAMMA GLOBULIN 2 5 1 9 - 3 7 g/dL (calc)    A/G RATIO 1 8 1 0 - 2 5 (calc)    Total Bilirubin 0 6 0 2 - 1 2 mg/dL    Alkaline Phosphatase 47 33 - 115 U/L    AST 13 10 - 30 U/L    ALT 10 6 - 29 U/L   CBC AND DIFFERENTIAL (HISTORICAL)    Collection Time: 10/27/17 11:38 AM   Result Value Ref Range    WBC 5 1 3 8 - 10 8 Thousand/uL    RBC 4 34 3 80 - 5 10 Million/uL    Hemoglobin 13 0 11 7 - 15 5 g/dL    Hematocrit 38 1 35 0 - 45 0 %    MCV 87 8 80 0 - 100 0 fL    MCH 30 0 27 0 - 33 0 pg    MCHC 34 1 32 0 - 36 0 g/dL    RDW 13 3 11 0 - 15 0 %    Platelets 282 394 - 983 Thousand/uL    Neutrophils Absolute 2387 1500 - 7800 cells/uL    Lymphocytes Absolute 2254 850 - 3900 cells/uL    Monocytes Absolute 367 200 - 950 cells/uL    Eosinophils Absolute 51 15 - 500 cells/uL    Basophils Absolute 41 0 - 200 cells/uL    Neutrophils Absolute 46 8 %    Lymphocytes Absolute 44 2 %    MONOCYTES 7 2 %    Eosinophils Absolute 1 0 %    Basophils Relative 0 8 %    MPV 9 2 7 5 - 12 5 fL   T4, FREE (HISTORICAL)    Collection Time: 10/27/17 11:38 AM   Result Value Ref Range    Free T4 1 5 0 8 - 1 8 ng/dL   Hepatitis B Immunity Panel    Collection Time: 04/24/18  9:21 AM   Result Value Ref Range    Hep B Ab, Total NON-REACTIVE NON-REACTIVE    Hepatitis B Surface Antibody Ql REACTIVE (A) NON-REACTIVE   Hep B Surface Ab, Ql    Collection Time: 05/10/18  1:19 PM   Result Value Ref Range    Hepatitis B Surface Antibody Ql REACTIVE (A) NON-REACTIVE   Hepatitis C Ab W/Refl To HCV RNA, Qn, PCR    Collection Time: 05/10/18  1:19 PM   Result Value Ref Range    SL AMB HEP C AB NON-REACTIVE NON-REACTIVE    Signal to Cut-Off 0 02 <1 00   Test Authorization    Collection Time: 05/10/18  1:19 PM   Result Value Ref Range    Test Name  HEPATITIS C AB W/REFL TO     Test Code  [id number]     Client Contact HUGH GAITAN     Report Always Message Signature      Always Message     Hepatitis B surface antibody    Collection Time: 05/10/18  1:19 PM   Result Value Ref Range    Hepatitis B Surface Antibody Immunity, QN >1000 > OR = 10 mIU/mL   Liquid-based pap, screening    Collection Time: 05/21/18  2:56 PM   Result Value Ref Range    Case Report       Gynecologic Cytology Report                       Case: [id number]                                  Authorizing Provider:  Sara Ferrer DO         Collected:           05/21/2018 1456              [facility]  Received:            05/21/2018 [address] [place] [address]                                                                       First Screen:          Brendia Oz, CT                                                    Specimen:    LIQUID-BASED PAP, SCREENING, Cervix, Endocervical                                          Primary Interpretation Negative for intraepithelial lesion or malignancy     Specimen Adequacy       Satisfactory for evaluation  Endocervical/transformation zone component present      Additional Information       [employer]'s FDA approved ,  and ThinPrep Imaging System are utilized with strict adherence to the 's instruction manual to prepare gynecologic and non-gynecologic cytology specimens for the production of ThinPrep slides as well as for gynecologic ThinPrep imaging  These processes have been validated by our laboratory and/or by the   The Pap test is not a diagnostic procedure and should not be used as the sole means to detect cervical cancer  It is only a screening procedure to aid in the detection of cervical cancer and its precursors  Both false-negative and false-positive results have been experienced  Your patient's test result should be interpreted in this context together with the history and clinical findings  Assessment/Plan:         Problem List Items Addressed This Visit     ADD (attention deficit disorder) without hyperactivity       Continue on Adderall XR  40 mg once daily  No side effects from medication  Patient has been on this for several years with good benefit  This will require prior authorization through her insurance  Prior to prescribing the controlled substance, a patient search was performed on the [place] prescription drug monitoring program web site  There was no evidence of diversion or misuse  Prescription provided           Relevant Medications    amphetamine-dextroamphetamine (ADDERALL XR) 20 MG 24 hr capsule    Screening for tuberculosis      As per her school is requirement she will need  tuberculosis screening  Patient will be sent for QuantiFERON gold as opposed to 2 step PPD  school forms are retained until results of QuantiFERON gold comes in         Relevant Orders    Quantiferon TB Gold    Physical exam, annual - Primary       Healthy 49-year-old female

## 2018-08-07 NOTE — ASSESSMENT & PLAN NOTE
As per her school is requirement she will need  tuberculosis screening  Patient will be sent for QuantiFERON gold as opposed to 2 step PPD       school forms are retained until results of QuantiFERON gold comes in

## 2018-08-09 LAB
ANNOTATION COMMENT IMP: NORMAL
GAMMA INTERFERON BACKGROUND BLD IA-ACNC: 0.25 IU/ML
M TB IFN-G BLD-IMP: NEGATIVE
M TB IFN-G CD4+ BCKGRND COR BLD-ACNC: 0.02 IU/ML
M TB IFN-G CD4+ T-CELLS BLD-ACNC: 0.27 IU/ML
MITOGEN IGNF BLD-ACNC: 9.33 IU/ML
QUANTIFERON-TB GOLD IN TUBE: NORMAL
SERVICE CMNT-IMP: NORMAL

## 2018-09-10 DIAGNOSIS — F98.8 ADD (ATTENTION DEFICIT DISORDER) WITHOUT HYPERACTIVITY: ICD-10-CM

## 2018-09-10 RX ORDER — DEXTROAMPHETAMINE SACCHARATE, AMPHETAMINE ASPARTATE MONOHYDRATE, DEXTROAMPHETAMINE SULFATE AND AMPHETAMINE SULFATE 5; 5; 5; 5 MG/1; MG/1; MG/1; MG/1
40 CAPSULE, EXTENDED RELEASE ORAL EVERY MORNING
Qty: 60 CAPSULE | Refills: 0 | Status: SHIPPED | OUTPATIENT
Start: 2018-09-10 | End: 2018-11-05 | Stop reason: SDUPTHER

## 2018-10-31 DIAGNOSIS — F98.8 ADD (ATTENTION DEFICIT DISORDER) WITHOUT HYPERACTIVITY: ICD-10-CM

## 2018-10-31 RX ORDER — DEXTROAMPHETAMINE SACCHARATE, AMPHETAMINE ASPARTATE MONOHYDRATE, DEXTROAMPHETAMINE SULFATE AND AMPHETAMINE SULFATE 5; 5; 5; 5 MG/1; MG/1; MG/1; MG/1
40 CAPSULE, EXTENDED RELEASE ORAL EVERY MORNING
Qty: 60 CAPSULE | Refills: 0 | Status: CANCELLED | OUTPATIENT
Start: 2018-10-31

## 2018-10-31 NOTE — TELEPHONE ENCOUNTER
Patient requesting refill of Adderall  Patient last seen in August instructed to return in 6 months  PDMP checked  Last fill was 9/22/18 at [address]  #[address]  Requesting to be sent to [facility], please call when completed

## 2018-11-05 DIAGNOSIS — F98.8 ADD (ATTENTION DEFICIT DISORDER) WITHOUT HYPERACTIVITY: ICD-10-CM

## 2018-11-05 RX ORDER — DEXTROAMPHETAMINE SACCHARATE, AMPHETAMINE ASPARTATE MONOHYDRATE, DEXTROAMPHETAMINE SULFATE AND AMPHETAMINE SULFATE 5; 5; 5; 5 MG/1; MG/1; MG/1; MG/1
40 CAPSULE, EXTENDED RELEASE ORAL EVERY MORNING
Qty: 60 CAPSULE | Refills: 0 | Status: SHIPPED | OUTPATIENT
Start: 2018-11-05 | End: 2019-02-06 | Stop reason: SDUPTHER

## 2019-02-06 ENCOUNTER — OFFICE VISIT (OUTPATIENT)
Dept: FAMILY MEDICINE CLINIC | Facility: CLINIC | Age: 29
End: 2019-02-06
Payer: COMMERCIAL

## 2019-02-06 VITALS
RESPIRATION RATE: 16 BRPM | HEART RATE: 80 BPM | SYSTOLIC BLOOD PRESSURE: 122 MMHG | DIASTOLIC BLOOD PRESSURE: 64 MMHG | OXYGEN SATURATION: 98 % | BODY MASS INDEX: 21.98 KG/M2 | HEIGHT: 68 IN | WEIGHT: 145 LBS

## 2019-02-06 DIAGNOSIS — F98.8 ADD (ATTENTION DEFICIT DISORDER) WITHOUT HYPERACTIVITY: Primary | ICD-10-CM

## 2019-02-06 PROCEDURE — 3008F BODY MASS INDEX DOCD: CPT | Performed by: FAMILY MEDICINE

## 2019-02-06 PROCEDURE — 99213 OFFICE O/P EST LOW 20 MIN: CPT | Performed by: FAMILY MEDICINE

## 2019-02-06 RX ORDER — DEXTROAMPHETAMINE SACCHARATE, AMPHETAMINE ASPARTATE MONOHYDRATE, DEXTROAMPHETAMINE SULFATE AND AMPHETAMINE SULFATE 5; 5; 5; 5 MG/1; MG/1; MG/1; MG/1
40 CAPSULE, EXTENDED RELEASE ORAL EVERY MORNING
Qty: 60 CAPSULE | Refills: 0 | Status: SHIPPED | OUTPATIENT
Start: 2019-02-06 | End: 2019-02-06 | Stop reason: SDUPTHER

## 2019-02-06 RX ORDER — DEXTROAMPHETAMINE SACCHARATE, AMPHETAMINE ASPARTATE MONOHYDRATE, DEXTROAMPHETAMINE SULFATE AND AMPHETAMINE SULFATE 5; 5; 5; 5 MG/1; MG/1; MG/1; MG/1
40 CAPSULE, EXTENDED RELEASE ORAL EVERY MORNING
Qty: 60 CAPSULE | Refills: 0 | Status: SHIPPED | OUTPATIENT
Start: 2019-03-06 | End: 2019-02-06 | Stop reason: SDUPTHER

## 2019-02-06 RX ORDER — DEXTROAMPHETAMINE SACCHARATE, AMPHETAMINE ASPARTATE MONOHYDRATE, DEXTROAMPHETAMINE SULFATE AND AMPHETAMINE SULFATE 5; 5; 5; 5 MG/1; MG/1; MG/1; MG/1
40 CAPSULE, EXTENDED RELEASE ORAL EVERY MORNING
Qty: 60 CAPSULE | Refills: 0 | Status: SHIPPED | OUTPATIENT
Start: 2019-04-06 | End: 2020-03-13 | Stop reason: SDUPTHER

## 2019-02-06 NOTE — ASSESSMENT & PLAN NOTE
Continue on Adderall XR 40 mg once daily  This has provided good benefit  Patient has remained on this dosage for several years  No side effects from medication  Prior to prescribing the controlled substance, a patient search was performed on the [address] [facility] prescription drug monitoring program web site  There was no evidence of diversion or misuse    Prescription provided

## 2019-02-06 NOTE — PROGRESS NOTES
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  She has been in her clinical rotations for dental hygiene school but she has not had a need to take the medication  She is now in the process of studying for her boards as well as writing end of the year papers so she feels that she would benefit from having medication  No side effects from medication  No insomnia, tachycardia, palpitations or weight loss  Patient feels that the medication does work well for her when she is taking it  No other concerns  Past Medical History:   Diagnosis Date    ADD (attention deficit disorder)     H/O long-term treatment with high-risk medication        Family History   Problem Relation Age of Onset    Breast cancer Paternal Grandmother     Breast cancer Cousin         Paternal Cousin    Varicose Veins Mother        No past surgical history on file  reports that she has never smoked  She has never used smokeless tobacco  She reports that she drinks alcohol  She reports that she does not use drugs  Current Outpatient Prescriptions:     amphetamine-dextroamphetamine (ADDERALL XR) 20 MG 24 hr capsule, Take 2 capsules (40 mg total) by mouth every morning Max Daily Amount: 40 mg, Disp: 60 capsule, Rfl: 0    Etonogestrel (NEXPLANON) 76 MG IMPL, Inject under the skin, Disp: , Rfl:     The following portions of the patient's history were reviewed and updated as appropriate: allergies, current medications, past family history, past medical history, past social history, past surgical history and problem list     Review of Systems   Constitutional: Negative  HENT: Negative  Eyes: Negative  Respiratory: Negative  Cardiovascular: Negative  Gastrointestinal: Negative  Endocrine: Negative  Genitourinary: Negative  Musculoskeletal: Negative  Skin: Negative  Allergic/Immunologic: Negative  Neurological: Negative  Hematological: Negative  Psychiatric/Behavioral: Negative  Objective:    /64   Pulse 80   Resp 16   Ht 5' 8" (1 727 m)   Wt 65 8 kg (145 lb)   SpO2 98%   BMI 22 05 kg/m²      Physical Exam   Constitutional: She is oriented to person, place, and time  She appears well-developed and well-nourished  HENT:   Head: Normocephalic and atraumatic  Eyes: Pupils are equal, round, and reactive to light  Conjunctivae are normal    Neck: Normal range of motion  Neck supple  Cardiovascular: Normal rate and regular rhythm  Pulmonary/Chest: Effort normal and breath sounds normal    Abdominal: Soft  Bowel sounds are normal    Neurological: She is alert and oriented to person, place, and time  She has normal reflexes  Psychiatric: She has a normal mood and affect  Her behavior is normal  Judgment and thought content normal    Nursing note and vitals reviewed  No results found for this or any previous visit (from the past 1008 hour(s))  Assessment/Plan:    ADD (attention deficit disorder) without hyperactivity  Continue on Adderall XR 40 mg once daily  This has provided good benefit  Patient has remained on this dosage for several years  No side effects from medication  Prior to prescribing the controlled substance, a patient search was performed on the [place] prescription drug monitoring program web site  There was no evidence of diversion or misuse  Prescription provided          Problem List Items Addressed This Visit     ADD (attention deficit disorder) without hyperactivity - Primary     Continue on Adderall XR 40 mg once daily  This has provided good benefit  Patient has remained on this dosage for several years  No side effects from medication   Prior to prescribing the controlled substance, a patient search was performed on the [place] prescription drug monitoring program web site  There was no evidence of diversion or misuse    Prescription provided

## 2019-03-12 ENCOUNTER — OFFICE VISIT (OUTPATIENT)
Dept: URGENT CARE | Facility: CLINIC | Age: 29
End: 2019-03-12
Payer: COMMERCIAL

## 2019-03-12 VITALS
SYSTOLIC BLOOD PRESSURE: 113 MMHG | WEIGHT: 145 LBS | TEMPERATURE: 98.3 F | HEIGHT: 67 IN | OXYGEN SATURATION: 100 % | HEART RATE: 81 BPM | BODY MASS INDEX: 22.76 KG/M2 | RESPIRATION RATE: 16 BRPM | DIASTOLIC BLOOD PRESSURE: 75 MMHG

## 2019-03-12 DIAGNOSIS — R00.2 PALPITATIONS: ICD-10-CM

## 2019-03-12 DIAGNOSIS — R42 DIZZINESS AND GIDDINESS: Primary | ICD-10-CM

## 2019-03-12 DIAGNOSIS — R22.1 NECK SWELLING: ICD-10-CM

## 2019-03-12 PROCEDURE — G0382 LEV 3 HOSP TYPE B ED VISIT: HCPCS | Performed by: PHYSICIAN ASSISTANT

## 2019-03-12 PROCEDURE — 99283 EMERGENCY DEPT VISIT LOW MDM: CPT | Performed by: PHYSICIAN ASSISTANT

## 2019-03-12 PROCEDURE — 99203 OFFICE O/P NEW LOW 30 MIN: CPT | Performed by: PHYSICIAN ASSISTANT

## 2019-03-12 NOTE — PROGRESS NOTES
[facility]        NAME: José Antonio Cates is a 25 y o  female  : 1990    MRN: [id number]  DATE: 2019  TIME: 7:57 PM    Assessment and Plan   Dizziness and giddiness [R42]  1  Dizziness and giddiness     2  Palpitations     3  Neck swelling       I advised pt f/u with her PCP for repeat labs and possible imaging for concerns about her neck swelling  She will also f/u with a cardiologist due to the palpitation  She understood to proceed to ER if she develops any worsening sx  She is stable and asymptomatic now  Patient Instructions       Follow up with PCP in 3-5 days  Proceed to  ER if symptoms worsen  Chief Complaint     Chief Complaint   Patient presents with    Dizziness     yesterday after working out felt as though she was going to pass out  Today tired  BP was 112/45  Drank V* went up to 122/68   Swelling     feels as though she has neck swelling, Worried about thyroid issues  History of Present Illness       Pt is a 25 yr old female presenting for lightheadedness and feeling like she is about to pass out that began yesterday while working out at the gym  The lightheadedness is on and off and has occurred today while walking down the [place]  She states she gets palpitations at times with the lightheadedness  She has been taking her BP yesterday and today and was running at 112/55  She is 113/75 which she states is low for her  She has had this occur on and off for the past 2 years  She states she brought this up to her PCP but was not worked up for it  Her mother and grandfather have a cardiac history of abnormal heart valves also causing fatigue and lightheadedness, and pt's brother was born with a hole in his heart  Pt denies any chest pain, syncope, SOB  She has no current sx in exam room  She feels like the palpitations are on regular beats vs irregular   She also has an additional complaint of feeling like her neck is more swollen in the front, ongoing for over a year  She has had pain in the throat along with the swelling in the past  Her PCP performed blood work last year which came back normal according to pt, however, she is unsure if they tested for her thyroid  Review of Systems   Review of Systems   Constitutional: Negative for chills, diaphoresis, fatigue and fever  HENT: Negative for congestion, postnasal drip, rhinorrhea, sinus pressure, sinus pain, sore throat, trouble swallowing and voice change  Eyes: Negative for pain, redness and visual disturbance  Respiratory: Negative for cough, choking and shortness of breath  Cardiovascular: Positive for palpitations  Negative for chest pain  Gastrointestinal: Negative for nausea and vomiting  Musculoskeletal: Negative for arthralgias and myalgias  Skin: Negative for rash  Neurological: Positive for light-headedness  Negative for tremors, syncope, facial asymmetry, speech difficulty, weakness, numbness and headaches  Current Medications       Current Outpatient Medications:     [START ON 4/6/2019] amphetamine-dextroamphetamine (ADDERALL XR) 20 MG 24 hr capsule, Take 2 capsules (40 mg total) by mouth every morning Earliest Fill Date: 4/6/19 Max Daily Amount: 40 mg, Disp: 60 capsule, Rfl: 0    Etonogestrel (Zuleyma Heys) 68 MG IMPL, Inject under the skin, Disp: , Rfl:     Current Allergies     Allergies as of 03/12/2019 - Reviewed 03/12/2019   Allergen Reaction Noted    Penicillins  09/02/2008    Sulfa antibiotics  09/02/2008            The following portions of the patient's history were reviewed and updated as appropriate: allergies, current medications, past family history, past medical history, past social history, past surgical history and problem list      Past Medical History:   Diagnosis Date    ADD (attention deficit disorder)     H/O long-term treatment with high-risk medication        No past surgical history on file      Family History   Problem Relation Age of Onset    Breast cancer Paternal Grandmother     Breast cancer Cousin         Paternal Cousin    Varicose Veins Mother          Medications have been verified  Objective   /75 (Patient Position: Sitting)   Pulse 81   Temp 98 3 °F (36 8 °C) (Temporal)   Resp 16   Ht 5' 7" (1 702 m)   Wt 65 8 kg (145 lb)   SpO2 100%   BMI 22 71 kg/m²        Physical Exam     Physical Exam   Constitutional: She is oriented to person, place, and time  She appears well-developed and well-nourished  No distress  HENT:   Head: Normocephalic and atraumatic  Eyes: Pupils are equal, round, and reactive to light  Conjunctivae are normal    Neck: Normal range of motion  No tracheal deviation present  No thyromegaly present  No gross swelling of anterior neck noted on exam, however, pt states it appears larger to her  No mass or abnormalities palpated  Cardiovascular: Normal rate, regular rhythm and normal heart sounds  Exam reveals no gallop and no friction rub  No murmur heard  Pulmonary/Chest: Effort normal and breath sounds normal    Neurological: She is alert and oriented to person, place, and time  Skin: Skin is warm and dry  No rash noted  She is not diaphoretic  No erythema  Psychiatric: She has a normal mood and affect   Her behavior is normal  Judgment and thought content normal

## 2019-11-22 ENCOUNTER — ANNUAL EXAM (OUTPATIENT)
Dept: OBGYN CLINIC | Facility: CLINIC | Age: 29
End: 2019-11-22
Payer: COMMERCIAL

## 2019-11-22 VITALS
HEIGHT: 68 IN | SYSTOLIC BLOOD PRESSURE: 108 MMHG | WEIGHT: 158.2 LBS | BODY MASS INDEX: 23.98 KG/M2 | DIASTOLIC BLOOD PRESSURE: 64 MMHG

## 2019-11-22 DIAGNOSIS — Z30.46 ENCOUNTER FOR NEXPLANON REMOVAL: ICD-10-CM

## 2019-11-22 DIAGNOSIS — Z30.011 ENCOUNTER FOR INITIAL PRESCRIPTION OF CONTRACEPTIVE PILLS: ICD-10-CM

## 2019-11-22 DIAGNOSIS — Z01.419 WOMEN'S ANNUAL ROUTINE GYNECOLOGICAL EXAMINATION: Primary | ICD-10-CM

## 2019-11-22 PROCEDURE — 11982 REMOVE DRUG IMPLANT DEVICE: CPT | Performed by: OBSTETRICS & GYNECOLOGY

## 2019-11-22 PROCEDURE — S0612 ANNUAL GYNECOLOGICAL EXAMINA: HCPCS | Performed by: OBSTETRICS & GYNECOLOGY

## 2019-11-22 RX ORDER — NORETHINDRONE ACETATE AND ETHINYL ESTRADIOL 1MG-20(21)
1 KIT ORAL DAILY
Qty: 84 TABLET | Refills: 3 | Status: SHIPPED | OUTPATIENT
Start: 2019-11-22 | End: 2020-03-13 | Stop reason: ALTCHOICE

## 2019-11-22 NOTE — PROGRESS NOTES
Remove and insert drug implant  Date/Time: 11/22/2019 1:45 PM  Performed by: Martha Medina DO  Authorized by: Martha Medina DO     Consent:     Consent obtained:  Written    Consent given by:  Patient    Procedural risks discussed:  Bleeding and infection (bruising, neurovascular damage)    Patient questions answered: yes      Patient agrees, verbalizes understanding, and wants to proceed: yes      Educational handouts given: yes      Instructions and paperwork completed: yes    Indication:     Indication: Presence of non-biodegradable drug delivery implant    Pre-procedure:     Pre-procedure timeout performed: yes      Prepped with: alcohol 70%      Local anesthetic: bupivicaine 0 25%    The site was cleaned and prepped in a sterile fashion: yes    Procedure:     Procedure:  Removal    Small stab incision was made in arm: yes      Left/right:  Left    Visualization of implant was obtained: yes      Site was closed with steri-strips and pressure bandage applied: yes    Comments:      Implant grasped with herb and removed without difficulty  Patient tolerated well

## 2019-11-22 NOTE — PROGRESS NOTES
ASSESSMENT & PLAN:   Diagnoses and all orders for this visit:    Women's annual routine gynecological examination    Encounter for initial prescription of contraceptive pills  -     norethindrone-ethinyl estradiol (JUNEL FE 1/20) 1-20 MG-MCG per tablet; Take 1 tablet by mouth daily  - Start today  Take same time daily  The following were reviewed in today's visit: ASCCP guidelines, breast self exam, mammography screening ordered, use and side effects of OCPs, exercise and healthy diet  Patient to return to office in yearly for annual exam      All questions have been answered to her satisfaction  CC:  Annual Gynecologic Examination    HPI: Wilda Eaton is a 30 y o  Ann Settle who presents for annual gynecologic examination  She has the following concerns:  Wants nexplanon out, will start OCPs  Contraceptive options were reviewed, including hormonal methods, both combination (pill, patch, vaginal ring) and progesterone-only (pill, Depo Provera and Nexplanon), intrauterine devices (Mirena, Paige and Paraguard), barrier methods (condoms, diaphragm) and male/female sterilization  The mechanisms, risks, benefits and side effects of all methods were discussed  The risks of blood clots, stroke and breast cancer were reviewed  All questions have been answered to her satisfaction  Health Maintenance:    She exercises 3 days per week  She wears her seatbelt routinely  She is practicing breast self awareness  She feels safe at home  Patient tries to  follow a balanced diet        Past Medical History:   Diagnosis Date    ADD (attention deficit disorder)     H/O long-term treatment with high-risk medication        Past Surgical History:   Procedure Laterality Date    NO PAST SURGERIES         Past OB/Gyn History:  Period Cycle (Days): 28  Period Duration (Days): 5  Period Pattern: Regular  Menstrual Flow: Moderate  Menstrual Control: Tampon, Maxi pad, Thin pad, Panty liner  Menstrual Control Change Freq (Hours): 2  Dysmenorrhea: NonePatient's last menstrual period was 10/17/2019  History of sexually transmitted infection: No  Patient is currently sexually active  Birth control: nexplanon    Last Pap 2018 :  no abnormalities;  HPV negative    Family History  Family History   Problem Relation Age of Onset    Breast cancer Paternal [de-identified]     Breast cancer Cousin         Paternal Cousin    Varicose Veins Mother     No Known Problems Father        Social History:  Social History     Socioeconomic History    Marital status: Single     Spouse name: Not on file    Number of children: Not on file    Years of education: Not on file    Highest education level: Not on file   Occupational History    Not on file   Social Needs    Financial resource strain: Not on file    Food insecurity:     Worry: Not on file     Inability: Not on file    Transportation needs:     Medical: Not on file     Non-medical: Not on file   Tobacco Use    Smoking status: Never Smoker    Smokeless tobacco: Never Used   Substance and Sexual Activity    Alcohol use: Yes     Comment: socially    Drug use: No    Sexual activity: Yes     Partners: Male     Birth control/protection: Implant   Lifestyle    Physical activity:     Days per week: Not on file     Minutes per session: Not on file    Stress: Not on file   Relationships    Social connections:     Talks on phone: Not on file     Gets together: Not on file     Attends Tenriism service: Not on file     Active member of club or organization: Not on file     Attends meetings of clubs or organizations: Not on file     Relationship status: Not on file    Intimate partner violence:     Fear of current or ex partner: Not on file     Emotionally abused: Not on file     Physically abused: Not on file     Forced sexual activity: Not on file   Other Topics Concern    Not on file   Social History Narrative    Exercise habits     Presently lives with boyfriend    Patient is monogamous  Patient is currently employed  Allergies: Allergies   Allergen Reactions    Penicillins      Reaction Date: [id number];     Sulfa Antibiotics      Reaction Date: [id number]; Medications:    Current Outpatient Medications:     amphetamine-dextroamphetamine (ADDERALL XR) 20 MG 24 hr capsule, Take 2 capsules (40 mg total) by mouth every morning Earliest Fill Date: 4/6/19 Max Daily Amount: 40 mg, Disp: 60 capsule, Rfl: 0    Etonogestrel (NEXPLANON) 68 MG IMPL, Inject under the skin, Disp: , Rfl:     Review of Systems:  Review of Systems   Constitutional: Negative for unexpected weight change  Respiratory: Negative for shortness of breath  Cardiovascular: Negative for chest pain  Gastrointestinal: Positive for abdominal distention  Negative for abdominal pain, blood in stool, constipation, nausea and vomiting  Genitourinary: Positive for menstrual problem (irregular bleeding dt nexplanon)  Negative for difficulty urinating, dysuria, frequency, pelvic pain, vaginal bleeding and vaginal discharge  Neurological: Negative for headaches  Physical Exam:  /64 (BP Location: Left arm, Patient Position: Sitting, Cuff Size: Standard)   Ht 5' 8" (1 727 m)   Wt 71 8 kg (158 lb 3 2 oz)   LMP 10/17/2019   BMI 24 05 kg/m²    Physical Exam   Constitutional: She is oriented to person, place, and time  Vital signs are normal  She appears well-developed and well-nourished  Genitourinary: Vagina normal and uterus normal  Pelvic exam was performed with patient supine  There is no rash, tenderness or lesion on the right labia  There is no rash, tenderness or lesion on the left labia  Vagina exhibits rugosity  No tenderness or bleeding in the vagina  No vaginal discharge found  Right adnexum does not display mass, does not display tenderness and does not display fullness  Left adnexum does not display mass, does not display tenderness and does not display fullness     Cervix is nulliparous  Cervix exhibits pinkness  Cervix does not exhibit motion tenderness, discharge or polyp  Uterus is mobile and anteverted  Uterus is not enlarged, tender or irregular (is regular)  Genitourinary Comments: No bladder tenderness  Urethral meatus midline, no masses  HENT:   Head: Normocephalic  Neck: No thyromegaly present  Cardiovascular: Normal rate, regular rhythm and normal heart sounds  Pulmonary/Chest: Effort normal and breath sounds normal  No respiratory distress  Right breast exhibits no inverted nipple, no mass, no nipple discharge, no skin change and no tenderness  Left breast exhibits no inverted nipple, no mass, no nipple discharge, no skin change and no tenderness  Abdominal: Soft  She exhibits no distension  There is no tenderness  Neurological: She is alert and oriented to person, place, and time  Psychiatric: She has a normal mood and affect  Her behavior is normal    Vitals reviewed

## 2020-03-13 ENCOUNTER — OFFICE VISIT (OUTPATIENT)
Dept: FAMILY MEDICINE CLINIC | Facility: CLINIC | Age: 30
End: 2020-03-13
Payer: COMMERCIAL

## 2020-03-13 VITALS
RESPIRATION RATE: 18 BRPM | SYSTOLIC BLOOD PRESSURE: 112 MMHG | HEIGHT: 68 IN | DIASTOLIC BLOOD PRESSURE: 72 MMHG | WEIGHT: 155.8 LBS | HEART RATE: 68 BPM | OXYGEN SATURATION: 98 % | BODY MASS INDEX: 23.61 KG/M2

## 2020-03-13 DIAGNOSIS — F98.8 ADD (ATTENTION DEFICIT DISORDER) WITHOUT HYPERACTIVITY: ICD-10-CM

## 2020-03-13 DIAGNOSIS — Z00.00 PHYSICAL EXAM, ANNUAL: Primary | ICD-10-CM

## 2020-03-13 PROCEDURE — 3008F BODY MASS INDEX DOCD: CPT | Performed by: FAMILY MEDICINE

## 2020-03-13 PROCEDURE — 99395 PREV VISIT EST AGE 18-39: CPT | Performed by: FAMILY MEDICINE

## 2020-03-13 RX ORDER — DEXTROAMPHETAMINE SACCHARATE, AMPHETAMINE ASPARTATE MONOHYDRATE, DEXTROAMPHETAMINE SULFATE AND AMPHETAMINE SULFATE 5; 5; 5; 5 MG/1; MG/1; MG/1; MG/1
40 CAPSULE, EXTENDED RELEASE ORAL EVERY MORNING
Qty: 60 CAPSULE | Refills: 0 | Status: SHIPPED | OUTPATIENT
Start: 2020-03-13 | End: 2020-03-13 | Stop reason: SDUPTHER

## 2020-03-13 RX ORDER — DEXTROAMPHETAMINE SACCHARATE, AMPHETAMINE ASPARTATE MONOHYDRATE, DEXTROAMPHETAMINE SULFATE AND AMPHETAMINE SULFATE 5; 5; 5; 5 MG/1; MG/1; MG/1; MG/1
40 CAPSULE, EXTENDED RELEASE ORAL EVERY MORNING
Qty: 60 CAPSULE | Refills: 0 | Status: SHIPPED | OUTPATIENT
Start: 2020-03-13 | End: 2020-05-08 | Stop reason: SDUPTHER

## 2020-03-13 NOTE — ASSESSMENT & PLAN NOTE
- annual physical examination performed  Generally healthy 38-year-old female    -patient given order for screening blood work including CBC, CMP, TSH and lipid profile    Will contact patient with results of blood work

## 2020-03-13 NOTE — PROGRESS NOTES
Subjective:      Patient ID: Keshia Grubbs is a 30 y o  female  27-year-old female presents for annual physical examination as well as follow-up in regards to attention deficit disorder  Patient actually has not been seen in some time  She is doing extremely well  She is working as a dental hygienist   She is planning to return back to college doing online classes to obtain her bachelor's degree  While the patient was going to dental hygiene school she was on Adderall  She does have ADD that was diagnosed when she was a child  She does extremely well on Adderall  She has not taken the medication in well over 6 months because she really does not needed for work but with planning on returning to school she would like to have a prescription available  Past Medical History:   Diagnosis Date    ADD (attention deficit disorder)     H/O long-term treatment with high-risk medication        Family History   Problem Relation Age of Onset    Breast cancer Paternal Grandmother     Breast cancer Cousin         Paternal Cousin    Varicose Veins Mother     No Known Problems Father        Past Surgical History:   Procedure Laterality Date    NO PAST SURGERIES          reports that she has never smoked  She has never used smokeless tobacco  She reports that she drinks alcohol  She reports that she does not use drugs  Current Outpatient Medications:     amphetamine-dextroamphetamine (ADDERALL XR) 20 MG 24 hr capsule, Take 2 capsules (40 mg total) by mouth every morningMax Daily Amount: 40 mg, Disp: 60 capsule, Rfl: 0    The following portions of the patient's history were reviewed and updated as appropriate: allergies, current medications, past family history, past medical history, past social history, past surgical history and problem list     Review of Systems   Constitutional: Negative  HENT: Negative  Eyes: Negative  Respiratory: Negative  Cardiovascular: Negative      Gastrointestinal: Negative  Endocrine: Negative  Genitourinary: Negative  Musculoskeletal: Negative  Skin: Negative  Allergic/Immunologic: Negative  Neurological: Negative  Hematological: Negative  Psychiatric/Behavioral: Positive for decreased concentration  Negative for agitation, behavioral problems, dysphoric mood, hallucinations and self-injury  The patient is not nervous/anxious  Objective:    /72   Pulse 68   Resp 18   Ht 5' 8" (1 727 m)   Wt 70 7 kg (155 lb 12 8 oz)   SpO2 98%   BMI 23 69 kg/m²      Physical Exam   Constitutional: She is oriented to person, place, and time  She appears well-developed and well-nourished  HENT:   Head: Normocephalic and atraumatic  Right Ear: External ear normal    Left Ear: External ear normal    Nose: Nose normal    Mouth/Throat: Oropharynx is clear and moist    Eyes: Pupils are equal, round, and reactive to light  Conjunctivae and EOM are normal    Neck: Normal range of motion  Neck supple  Cardiovascular: Normal rate, regular rhythm and normal heart sounds  No murmur heard  Pulmonary/Chest: Effort normal and breath sounds normal    Abdominal: Soft  Bowel sounds are normal    Neurological: She is alert and oriented to person, place, and time  She has normal reflexes  Psychiatric: She has a normal mood and affect  Her behavior is normal  Judgment and thought content normal    Nursing note and vitals reviewed  No results found for this or any previous visit (from the past 1008 hour(s))  Assessment/Plan:    Physical exam, annual  - annual physical examination performed  Generally healthy 42-year-old female    -patient given order for screening blood work including CBC, CMP, TSH and lipid profile    Will contact patient with results of blood work    ADD (attention deficit disorder) without hyperactivity  Patient has done extremely well on Adderall in the past   Refill provided of extended release Adderall for the patient to be taking 40 mg once daily when she returns to school  I did advise the patient that she will need to return to the office in 4-5 months for refills but otherwise will need to contact the office to have refills electronically prescribed    -Prior to prescribing the controlled substance, a patient search was performed on the [place] prescription drug monitoring program web site  There was no evidence of diversion or misuse  Prescription provided          Problem List Items Addressed This Visit        Other    ADD (attention deficit disorder) without hyperactivity     Patient has done extremely well on Adderall in the past   Refill provided of extended release Adderall for the patient to be taking 40 mg once daily when she returns to school  I did advise the patient that she will need to return to the office in 4-5 months for refills but otherwise will need to contact the office to have refills electronically prescribed    -Prior to prescribing the controlled substance, a patient search was performed on the [place] prescription drug monitoring program web site  There was no evidence of diversion or misuse  Prescription provided         Relevant Medications    amphetamine-dextroamphetamine (ADDERALL XR) 20 MG 24 hr capsule    Physical exam, annual - Primary     - annual physical examination performed  Generally healthy 25-year-old female    -patient given order for screening blood work including CBC, CMP, TSH and lipid profile    Will contact patient with results of blood work         Relevant Orders    CBC and differential    Comprehensive metabolic panel    TSH, 3rd generation with Free T4 reflex    Lipid panel

## 2020-03-13 NOTE — ASSESSMENT & PLAN NOTE
Patient has done extremely well on Adderall in the past   Refill provided of extended release Adderall for the patient to be taking 40 mg once daily when she returns to school  I did advise the patient that she will need to return to the office in 4-5 months for refills but otherwise will need to contact the office to have refills electronically prescribed    -Prior to prescribing the controlled substance, a patient search was performed on the [place] prescription drug monitoring program web site  There was no evidence of diversion or misuse    Prescription provided

## 2020-05-08 DIAGNOSIS — F98.8 ADD (ATTENTION DEFICIT DISORDER) WITHOUT HYPERACTIVITY: ICD-10-CM

## 2020-05-08 RX ORDER — DEXTROAMPHETAMINE SACCHARATE, AMPHETAMINE ASPARTATE MONOHYDRATE, DEXTROAMPHETAMINE SULFATE AND AMPHETAMINE SULFATE 5; 5; 5; 5 MG/1; MG/1; MG/1; MG/1
40 CAPSULE, EXTENDED RELEASE ORAL EVERY MORNING
Qty: 60 CAPSULE | Refills: 0 | Status: SHIPPED | OUTPATIENT
Start: 2020-05-08 | End: 2021-04-02

## 2021-01-14 ENCOUNTER — LAB (OUTPATIENT)
Dept: LAB | Facility: CLINIC | Age: 31
End: 2021-01-14
Payer: COMMERCIAL

## 2021-01-14 ENCOUNTER — TRANSCRIBE ORDERS (OUTPATIENT)
Dept: LAB | Facility: CLINIC | Age: 31
End: 2021-01-14

## 2021-01-14 DIAGNOSIS — Z00.00 PHYSICAL EXAM, ANNUAL: ICD-10-CM

## 2021-01-14 LAB
ALBUMIN SERPL BCP-MCNC: 4.2 G/DL (ref 3.5–5)
ALP SERPL-CCNC: 45 U/L (ref 46–116)
ALT SERPL W P-5'-P-CCNC: 24 U/L (ref 12–78)
ANION GAP SERPL CALCULATED.3IONS-SCNC: 3 MMOL/L (ref 4–13)
AST SERPL W P-5'-P-CCNC: 13 U/L (ref 5–45)
BASOPHILS # BLD AUTO: 0.05 THOUSANDS/ΜL (ref 0–0.1)
BASOPHILS NFR BLD AUTO: 1 % (ref 0–1)
BILIRUB SERPL-MCNC: 0.58 MG/DL (ref 0.2–1)
BUN SERPL-MCNC: 18 MG/DL (ref 5–25)
CALCIUM SERPL-MCNC: 9.3 MG/DL (ref 8.3–10.1)
CHLORIDE SERPL-SCNC: 105 MMOL/L (ref 100–108)
CHOLEST SERPL-MCNC: 207 MG/DL (ref 50–200)
CO2 SERPL-SCNC: 29 MMOL/L (ref 21–32)
CREAT SERPL-MCNC: 0.84 MG/DL (ref 0.6–1.3)
EOSINOPHIL # BLD AUTO: 0.07 THOUSAND/ΜL (ref 0–0.61)
EOSINOPHIL NFR BLD AUTO: 1 % (ref 0–6)
ERYTHROCYTE [DISTWIDTH] IN BLOOD BY AUTOMATED COUNT: 12.3 % (ref 11.6–15.1)
GFR SERPL CREATININE-BSD FRML MDRD: 92 ML/MIN/1.73SQ M
GLUCOSE P FAST SERPL-MCNC: 78 MG/DL (ref 65–99)
HCT VFR BLD AUTO: 42.9 % (ref 34.8–46.1)
HDLC SERPL-MCNC: 69 MG/DL
HGB BLD-MCNC: 13.7 G/DL (ref 11.5–15.4)
IMM GRANULOCYTES # BLD AUTO: 0.02 THOUSAND/UL (ref 0–0.2)
IMM GRANULOCYTES NFR BLD AUTO: 0 % (ref 0–2)
LDLC SERPL CALC-MCNC: 127 MG/DL (ref 0–100)
LYMPHOCYTES # BLD AUTO: 2.41 THOUSANDS/ΜL (ref 0.6–4.47)
LYMPHOCYTES NFR BLD AUTO: 44 % (ref 14–44)
MCH RBC QN AUTO: 30.5 PG (ref 26.8–34.3)
MCHC RBC AUTO-ENTMCNC: 31.9 G/DL (ref 31.4–37.4)
MCV RBC AUTO: 96 FL (ref 82–98)
MONOCYTES # BLD AUTO: 0.38 THOUSAND/ΜL (ref 0.17–1.22)
MONOCYTES NFR BLD AUTO: 7 % (ref 4–12)
NEUTROPHILS # BLD AUTO: 2.53 THOUSANDS/ΜL (ref 1.85–7.62)
NEUTS SEG NFR BLD AUTO: 47 % (ref 43–75)
NONHDLC SERPL-MCNC: 138 MG/DL
NRBC BLD AUTO-RTO: 0 /100 WBCS
PLATELET # BLD AUTO: 310 THOUSANDS/UL (ref 149–390)
PMV BLD AUTO: 8.7 FL (ref 8.9–12.7)
POTASSIUM SERPL-SCNC: 4.5 MMOL/L (ref 3.5–5.3)
PROT SERPL-MCNC: 7.3 G/DL (ref 6.4–8.2)
RBC # BLD AUTO: 4.49 MILLION/UL (ref 3.81–5.12)
SODIUM SERPL-SCNC: 137 MMOL/L (ref 136–145)
TRIGL SERPL-MCNC: 56 MG/DL
TSH SERPL DL<=0.05 MIU/L-ACNC: 0.79 UIU/ML (ref 0.36–3.74)
WBC # BLD AUTO: 5.46 THOUSAND/UL (ref 4.31–10.16)

## 2021-01-14 PROCEDURE — 80061 LIPID PANEL: CPT

## 2021-01-14 PROCEDURE — 80053 COMPREHEN METABOLIC PANEL: CPT

## 2021-01-14 PROCEDURE — 36415 COLL VENOUS BLD VENIPUNCTURE: CPT

## 2021-01-14 PROCEDURE — 85025 COMPLETE CBC W/AUTO DIFF WBC: CPT

## 2021-01-14 PROCEDURE — 84443 ASSAY THYROID STIM HORMONE: CPT

## 2021-01-15 ENCOUNTER — TELEPHONE (OUTPATIENT)
Dept: FAMILY MEDICINE CLINIC | Facility: CLINIC | Age: 31
End: 2021-01-15

## 2021-01-15 NOTE — TELEPHONE ENCOUNTER
----- Message from Alvaro Castillo DO sent at 1/15/2021  7:56 AM EST -----  Essentially normal lab results  Follow-up as scheduled in March    Please send a copy of the lab results to the patient's home

## 2021-01-15 NOTE — RESULT ENCOUNTER NOTE
Essentially normal lab results  Follow-up as scheduled in March    Please send a copy of the lab results to the patient's home

## 2021-01-26 ENCOUNTER — TELEPHONE (OUTPATIENT)
Dept: FAMILY MEDICINE CLINIC | Facility: CLINIC | Age: 31
End: 2021-01-26

## 2021-01-26 NOTE — TELEPHONE ENCOUNTER
Patient called stating she would like an order for a mammo, she thought there was already one in but there isnt, she stated she thinks she feels a lump as well which is why she wanted the order

## 2021-01-27 DIAGNOSIS — N63.20 LUMP OF LEFT BREAST: Primary | ICD-10-CM

## 2021-01-27 NOTE — TELEPHONE ENCOUNTER
Screening mammography does not start until age 34 so she would not have an order  She would need a diagnostic mammogram with reflex to ultrasound if she feels a lump    Unfortunately or normally she has 2 breasts so I do not know which 1 to ordered for

## 2021-02-13 ENCOUNTER — HOSPITAL ENCOUNTER (OUTPATIENT)
Dept: MAMMOGRAPHY | Facility: CLINIC | Age: 31
Discharge: HOME | End: 2021-02-13
Payer: COMMERCIAL

## 2021-02-13 ENCOUNTER — HOSPITAL ENCOUNTER (OUTPATIENT)
Dept: ULTRASOUND IMAGING | Facility: CLINIC | Age: 31
Discharge: HOME | End: 2021-02-13
Payer: COMMERCIAL

## 2021-02-13 ENCOUNTER — TRANSCRIBE ORDERS (OUTPATIENT)
Dept: MAMMOGRAPHY | Facility: CLINIC | Age: 31
End: 2021-02-13

## 2021-02-13 VITALS — WEIGHT: 155 LBS | HEIGHT: 68 IN | BODY MASS INDEX: 23.49 KG/M2

## 2021-02-13 DIAGNOSIS — N63.20 LUMP OF LEFT BREAST: ICD-10-CM

## 2021-02-13 DIAGNOSIS — N63.20 MASS OF LEFT BREAST: Primary | ICD-10-CM

## 2021-02-13 PROCEDURE — G0279 TOMOSYNTHESIS, MAMMO: HCPCS

## 2021-02-13 PROCEDURE — 76642 ULTRASOUND BREAST LIMITED: CPT

## 2021-02-13 PROCEDURE — 77066 DX MAMMO INCL CAD BI: CPT

## 2021-02-15 ENCOUNTER — TELEPHONE (OUTPATIENT)
Dept: FAMILY MEDICINE CLINIC | Facility: CLINIC | Age: 31
End: 2021-02-15

## 2021-02-15 DIAGNOSIS — N63.20 LUMP OF LEFT BREAST: Primary | ICD-10-CM

## 2021-02-15 NOTE — TELEPHONE ENCOUNTER
----- Message from Felipe Zuluaga DO sent at 2/15/2021  8:14 AM EST -----  Please call the patient regarding her abnormal result  Well-circumscribed hypoechoic 12 mm mass in the left breast which is likely a left breast cyst   Radiologist recommended repeat ultrasound of the left breast in 6 months to ensure stability    Order generated

## 2021-04-02 ENCOUNTER — OFFICE VISIT (OUTPATIENT)
Dept: FAMILY MEDICINE CLINIC | Facility: CLINIC | Age: 31
End: 2021-04-02
Payer: COMMERCIAL

## 2021-04-02 VITALS
BODY MASS INDEX: 24.28 KG/M2 | HEART RATE: 65 BPM | SYSTOLIC BLOOD PRESSURE: 116 MMHG | RESPIRATION RATE: 16 BRPM | WEIGHT: 160.2 LBS | HEIGHT: 68 IN | OXYGEN SATURATION: 97 % | DIASTOLIC BLOOD PRESSURE: 72 MMHG

## 2021-04-02 DIAGNOSIS — I83.812 VARICOSE VEINS OF LEFT LOWER EXTREMITY WITH PAIN: ICD-10-CM

## 2021-04-02 DIAGNOSIS — Z00.00 PHYSICAL EXAM, ANNUAL: Primary | ICD-10-CM

## 2021-04-02 DIAGNOSIS — N63.20 LUMP OF LEFT BREAST: ICD-10-CM

## 2021-04-02 PROCEDURE — 99395 PREV VISIT EST AGE 18-39: CPT | Performed by: FAMILY MEDICINE

## 2021-04-02 PROCEDURE — 3725F SCREEN DEPRESSION PERFORMED: CPT | Performed by: FAMILY MEDICINE

## 2021-04-02 PROCEDURE — 3008F BODY MASS INDEX DOCD: CPT | Performed by: FAMILY MEDICINE

## 2021-04-02 PROCEDURE — 1036F TOBACCO NON-USER: CPT | Performed by: FAMILY MEDICINE

## 2021-04-02 NOTE — ASSESSMENT & PLAN NOTE
- patient given information and order for vascular surgery evaluation    May be candidate for sclerotherapy    -advised the patient to wear knee-high compression stockings when working to prevent development of additional varicose veins

## 2021-04-02 NOTE — ASSESSMENT & PLAN NOTE
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

## 2021-04-02 NOTE — PROGRESS NOTES
Subjective:      Patient ID: Gauri Velasco is a 26 y o  female  Generally healthy 18-year-old female presents for annual physical examination  Patient is working as dental hygienist for [employer]   She did have blood work completed in January which showed essentially normal CBC, CMP, TSH and lipid profile  Total cholesterol 206, HDL 69,   Patient generally eats a very healthy diet except when she does not  Patient is frequently up on her feet and standing at work  She does have 2 small varicose veins that do become inflamed up by her left knee at the upper calf  Patient did have palpable breast mass in the left breast which was tender  Diagnostic ultrasound showed hypoechoic 12 mm structure without shadowing  This was likely to be a benign-appearing cyst   Repeat diagnostic ultrasound is scheduled for 6 months  Patient was on oral contraceptives for approximately 1 year  Scheduled to see gynecologist in 1      Past Medical History:   Diagnosis Date    ADD (attention deficit disorder)     H/O long-term treatment with high-risk medication        Family History   Problem Relation Age of Onset    Breast cancer Paternal Grandmother     Breast cancer Cousin         Paternal Cousin    Varicose Veins Mother     No Known Problems Father     No Known Problems Maternal Grandmother     Prostate cancer Maternal Grandfather     No Known Problems Paternal Aunt     No Known Problems Paternal Aunt        Past Surgical History:   Procedure Laterality Date    NO PAST SURGERIES          reports that she has never smoked  She has never used smokeless tobacco  She reports current alcohol use  She reports that she does not use drugs  No current outpatient medications on file      The following portions of the patient's history were reviewed and updated as appropriate: allergies, current medications, past family history, past medical history, past social history, past surgical history and problem list     Review of Systems   Constitutional: Negative  HENT: Negative  Eyes: Negative  Respiratory: Negative  Cardiovascular: Negative  Gastrointestinal: Negative  Endocrine: Negative  Genitourinary: Negative  Musculoskeletal: Negative  Skin: Negative  Allergic/Immunologic: Negative  Neurological: Negative  Hematological: Negative  Psychiatric/Behavioral: Negative  Objective:    /72   Pulse 65   Resp 16   Ht 5' 8" (1 727 m)   Wt 72 7 kg (160 lb 3 2 oz)   SpO2 97%   BMI 24 36 kg/m²      Physical Exam  Vitals signs and nursing note reviewed  Constitutional:       General: She is not in acute distress  Appearance: Normal appearance  She is well-developed and normal weight  She is not diaphoretic  HENT:      Head: Normocephalic and atraumatic  Right Ear: Tympanic membrane, ear canal and external ear normal       Left Ear: Tympanic membrane, ear canal and external ear normal    Eyes:      Extraocular Movements: Extraocular movements intact  Conjunctiva/sclera: Conjunctivae normal       Pupils: Pupils are equal, round, and reactive to light  Neck:      Musculoskeletal: Normal range of motion and neck supple  Cardiovascular:      Rate and Rhythm: Normal rate and regular rhythm  Pulses: Normal pulses  Heart sounds: Normal heart sounds  No murmur  Comments: Two small varicose veins at the left upper calf  Do not  Pulmonary:      Effort: Pulmonary effort is normal       Breath sounds: Normal breath sounds  Abdominal:      General: Abdomen is flat  Bowel sounds are normal       Palpations: Abdomen is soft  Musculoskeletal: Normal range of motion  Skin:     General: Skin is warm and dry  Findings: No rash  Neurological:      Mental Status: She is alert and oriented to person, place, and time  Deep Tendon Reflexes: Reflexes are normal and symmetric     Psychiatric:         Behavior: Behavior normal  Thought Content: Thought content normal          Judgment: Judgment normal            No results found for this or any previous visit (from the past 1008 hour(s))  Assessment/Plan:    Varicose veins of left lower extremity with pain  - patient given information and order for vascular surgery evaluation  May be candidate for sclerotherapy    -advised the patient to wear knee-high compression stockings when working to prevent development of additional varicose veins    Lump of left breast  Likely benign breast cyst   Repeat diagnostic ultrasound is scheduled for 6 months    Physical exam, annual  Generally healthy 33-year-old female    -strongly recommended that she receive COVID-19 vaccination when available  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          Problem List Items Addressed This Visit        Cardiovascular and Mediastinum    Varicose veins of left lower extremity with pain     - patient given information and order for vascular surgery evaluation  May be candidate for sclerotherapy    -advised the patient to wear knee-high compression stockings when working to prevent development of additional varicose veins         Relevant Orders    Ambulatory referral to Vascular Surgery       Other    Lump of left breast     Likely benign breast cyst   Repeat diagnostic ultrasound is scheduled for 6 months         Physical exam, annual - Primary     Generally healthy 33-year-old female    -strongly recommended that she receive COVID-19 vaccination when available    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

## 2021-08-02 ENCOUNTER — ANNUAL EXAM (OUTPATIENT)
Dept: OBGYN CLINIC | Facility: CLINIC | Age: 31
End: 2021-08-02
Payer: COMMERCIAL

## 2021-08-02 VITALS
HEIGHT: 68 IN | SYSTOLIC BLOOD PRESSURE: 100 MMHG | WEIGHT: 156 LBS | BODY MASS INDEX: 23.64 KG/M2 | DIASTOLIC BLOOD PRESSURE: 62 MMHG

## 2021-08-02 DIAGNOSIS — Z30.011 ENCOUNTER FOR INITIAL PRESCRIPTION OF CONTRACEPTIVE PILLS: ICD-10-CM

## 2021-08-02 DIAGNOSIS — Z01.419 WELL FEMALE EXAM WITH ROUTINE GYNECOLOGICAL EXAM: Primary | ICD-10-CM

## 2021-08-02 DIAGNOSIS — Z11.51 SPECIAL SCREENING EXAMINATION FOR HUMAN PAPILLOMAVIRUS (HPV): ICD-10-CM

## 2021-08-02 DIAGNOSIS — Z12.4 ENCOUNTER FOR SCREENING FOR CERVICAL CANCER: ICD-10-CM

## 2021-08-02 DIAGNOSIS — Z23 NEED FOR HPV VACCINE: ICD-10-CM

## 2021-08-02 PROCEDURE — 90471 IMMUNIZATION ADMIN: CPT

## 2021-08-02 PROCEDURE — G0476 HPV COMBO ASSAY CA SCREEN: HCPCS | Performed by: OBSTETRICS & GYNECOLOGY

## 2021-08-02 PROCEDURE — 90651 9VHPV VACCINE 2/3 DOSE IM: CPT

## 2021-08-02 PROCEDURE — G0145 SCR C/V CYTO,THINLAYER,RESCR: HCPCS | Performed by: OBSTETRICS & GYNECOLOGY

## 2021-08-02 PROCEDURE — S0612 ANNUAL GYNECOLOGICAL EXAMINA: HCPCS | Performed by: OBSTETRICS & GYNECOLOGY

## 2021-08-02 RX ORDER — NORETHINDRONE ACETATE AND ETHINYL ESTRADIOL 1MG-20(21)
1 KIT ORAL DAILY
Qty: 84 TABLET | Refills: 3 | Status: SHIPPED | OUTPATIENT
Start: 2021-08-02 | End: 2022-03-30

## 2021-08-02 NOTE — PROGRESS NOTES
ASSESSMENT & PLAN:   Diagnoses and all orders for this visit:    Well female exam with routine gynecological exam  Special screening examination for human papillomavirus (HPV)  Encounter for screening for cervical cancer   -     Liquid-based pap, screening    Encounter for initial prescription of contraceptive pills  -     norethindrone-ethinyl estradiol (Prudencio Troysey FE 1/20) 1-20 MG-MCG per tablet; Take 1 tablet by mouth daily    Need for HPV vaccine  -     HPV VACCINE 9 VALENT IM        The following were reviewed in today's visit: ASCCP guidelines, Gardisil vaccination, STD testing breast self exam, use and side effects of OCPs, exercise and healthy diet  Patient to return to office in yearly for annual exam      All questions have been answered to her satisfaction  CC:  Annual Gynecologic Examination    HPI: Dejon Juarez is a 26 y o  Et[place] Pod who presents for annual gynecologic examination  She has the following concerns:  Cramps are getting bad since stopping the pill  Would like to restart  Health Maintenance:    She exercises 3 days per week  She wears her seatbelt routinely  She is practicing breast self awareness  Patient does try to follow a balanced diet  Past Medical History:   Diagnosis Date    ADD (attention deficit disorder)     Breast lump     H/O long-term treatment with high-risk medication        Past Surgical History:   Procedure Laterality Date    NO PAST SURGERIES         Past OB/Gyn History:  Period Cycle (Days): 30  Period Duration (Days): 5-6  Period Pattern: Regular  Menstrual Flow: Heavy, Moderate  Dysmenorrhea: (!) Moderate  Dysmenorrhea Symptoms: Cramping, HeadachePatient's last menstrual period was 07/20/2021  History of sexually transmitted infection No, is not interested in STD testing today  Patient is currently sexually active     Birth control:no method  Gardisil Vaccination completed: no  Last Pap  2018 : NILM, neg HPV     Family History  Family History   Problem Relation Age of Onset    Breast cancer Paternal [de-identified]     Breast cancer Cousin         Paternal Cousin    Varicose Veins Mother     No Known Problems Father     No Known Problems Maternal Grandmother     Prostate cancer Maternal Grandfather     No Known Problems Paternal Aunt     No Known Problems Paternal Aunt        Social History:  Social History     Socioeconomic History    Marital status: Single     Spouse name: Not on file    Number of children: Not on file    Years of education: Not on file    Highest education level: Not on file   Occupational History    Not on file   Tobacco Use    Smoking status: Never Smoker    Smokeless tobacco: Never Used   Vaping Use    Vaping Use: Never used   Substance and Sexual Activity    Alcohol use: Yes     Comment: socially    Drug use: No    Sexual activity: Yes     Partners: Male     Birth control/protection: None   Other Topics Concern    Not on file   Social History Narrative    Exercise habits     Social Determinants of Health     Financial Resource Strain:     Difficulty of Paying Living Expenses:    Food Insecurity:     Worried About Running Out of Food in the Last Year:     [address] in the Last Year:    Transportation Needs:     Lack of Transportation (Medical):  Lack of Transportation (Non-Medical):    Physical Activity:     Days of Exercise per Week:     Minutes of Exercise per Session:    Stress:     Feeling of Stress :    Social Connections:     Frequency of Communication with Friends and Family:     Frequency of Social Gatherings with Friends and Family:     Attends Faith Services:     Active Member of Clubs or Organizations:     Attends Club or Organization Meetings:     Marital Status:    Intimate Partner Violence:     Fear of Current or Ex-Partner:     Emotionally Abused:     Physically Abused:     Sexually Abused:      Presently lives with boyfriend  She feels safe at home     Patient is currently employed  Allergies: Allergies   Allergen Reactions    Penicillins      Reaction Date: [id number];     Sulfa Antibiotics      Reaction Date: 02Sep2008; Medications:  No current outpatient medications on file  Review of Systems:  Review of Systems   Constitutional: Negative for chills, fever and unexpected weight change  Respiratory: Negative for cough and shortness of breath  Cardiovascular: Negative for chest pain and palpitations  Gastrointestinal: Negative for abdominal distention, abdominal pain, blood in stool, constipation, nausea and vomiting  Genitourinary: Positive for menstrual problem (period cramping)  Negative for difficulty urinating, dysuria, frequency, pelvic pain, urgency, vaginal bleeding, vaginal discharge and vaginal pain  Neurological: Negative for headaches  Physical Exam:  /62 (BP Location: Left arm, Patient Position: Sitting, Cuff Size: Large)   Ht 5' 8" (1 727 m)   Wt 70 8 kg (156 lb)   LMP 07/20/2021   BMI 23 72 kg/m²    Physical Exam  Constitutional:       General: She is awake  Appearance: Normal appearance  She is well-developed  Genitourinary:      Pelvic exam was performed with patient in the lithotomy position  Vulva, urethra, bladder, vagina and uterus normal       No vulval lesion, ulcerations or rash noted  No urethral prolapse present  Bladder is not distended or tender  No vaginal discharge, erythema, tenderness, bleeding, atrophy or prolapse  No cervical motion tenderness, discharge, lesion or polyp  Uterus is mobile  Uterus is not enlarged, tender or irregular  No uterine mass detected  Uterus is anteverted and regular  No right or left adnexal mass present  Right adnexa not tender or full  Left adnexa not tender or full  HENT:      Head: Normocephalic and atraumatic  Cardiovascular:      Rate and Rhythm: Normal rate and regular rhythm        Heart sounds: Normal heart sounds  Pulmonary:      Effort: Pulmonary effort is normal  No tachypnea or respiratory distress  Breath sounds: Normal breath sounds  Chest:      Breasts:         Right: Normal  No inverted nipple, mass, nipple discharge, skin change or tenderness  Left: Normal  No inverted nipple, mass, nipple discharge, skin change or tenderness  Abdominal:      General: There is no distension  Palpations: Abdomen is soft  Tenderness: There is no abdominal tenderness  There is no guarding  Musculoskeletal:      Cervical back: Neck supple  Lymphadenopathy:      Upper Body:      Right upper body: No supraclavicular or axillary adenopathy  Left upper body: No supraclavicular or axillary adenopathy  Neurological:      General: No focal deficit present  Mental Status: She is alert and oriented to person, place, and time  Psychiatric:         Mood and Affect: Mood normal          Behavior: Behavior normal          Thought Content: Thought content normal          Judgment: Judgment normal    Vitals reviewed

## 2021-08-02 NOTE — PATIENT INSTRUCTIONS
If interested in Gardasil vaccination, please contact the office to schedule an appointment  Here is some information regarding vaccine  [email or web address]  pdf

## 2021-08-04 LAB
HPV HR 12 DNA CVX QL NAA+PROBE: NEGATIVE
HPV16 DNA CVX QL NAA+PROBE: NEGATIVE
HPV18 DNA CVX QL NAA+PROBE: NEGATIVE

## 2021-08-06 LAB
LAB AP GYN PRIMARY INTERPRETATION: NORMAL
Lab: NORMAL

## 2021-08-06 NOTE — RESULT ENCOUNTER NOTE
Navin Jackman,     Your HPV testing is negative  Your pap is still pending, and will be updated soon  Please contact the office at [phone] with any questions       Timur

## 2021-08-11 ENCOUNTER — OFFICE VISIT (OUTPATIENT)
Dept: FAMILY MEDICINE CLINIC | Facility: CLINIC | Age: 31
End: 2021-08-11
Payer: COMMERCIAL

## 2021-08-11 VITALS
OXYGEN SATURATION: 98 % | WEIGHT: 155 LBS | HEIGHT: 68 IN | DIASTOLIC BLOOD PRESSURE: 76 MMHG | TEMPERATURE: 97.6 F | SYSTOLIC BLOOD PRESSURE: 110 MMHG | HEART RATE: 63 BPM | RESPIRATION RATE: 16 BRPM | BODY MASS INDEX: 23.49 KG/M2

## 2021-08-11 DIAGNOSIS — M21.612 BUNION OF GREAT TOE OF LEFT FOOT: ICD-10-CM

## 2021-08-11 DIAGNOSIS — M54.42 CHRONIC BILATERAL LOW BACK PAIN WITH BILATERAL SCIATICA: Primary | ICD-10-CM

## 2021-08-11 DIAGNOSIS — G89.29 CHRONIC BILATERAL LOW BACK PAIN WITH BILATERAL SCIATICA: Primary | ICD-10-CM

## 2021-08-11 DIAGNOSIS — M54.41 CHRONIC BILATERAL LOW BACK PAIN WITH BILATERAL SCIATICA: Primary | ICD-10-CM

## 2021-08-11 PROCEDURE — 3008F BODY MASS INDEX DOCD: CPT | Performed by: FAMILY MEDICINE

## 2021-08-11 PROCEDURE — 1036F TOBACCO NON-USER: CPT | Performed by: FAMILY MEDICINE

## 2021-08-11 PROCEDURE — 99213 OFFICE O/P EST LOW 20 MIN: CPT | Performed by: FAMILY MEDICINE

## 2021-08-11 RX ORDER — CYCLOBENZAPRINE HCL 5 MG
5 TABLET ORAL
Qty: 10 TABLET | Refills: 0 | Status: SHIPPED | OUTPATIENT
Start: 2021-08-11 | End: 2022-03-30

## 2021-08-11 RX ORDER — NAPROXEN 500 MG/1
500 TABLET ORAL 2 TIMES DAILY WITH MEALS
Qty: 28 TABLET | Refills: 0 | Status: SHIPPED | OUTPATIENT
Start: 2021-08-11 | End: 2022-04-21 | Stop reason: SDUPTHER

## 2021-08-11 NOTE — PROGRESS NOTES
Assessment/Plan:   Diagnoses and all orders for this visit:    Chronic bilateral low back pain with bilateral sciatica  -     naproxen (NAPROSYN) 500 mg tablet; Take 1 tablet (500 mg total) by mouth 2 (two) times a day with meals  -     Ambulatory referral to Physical Therapy; Future  -     cyclobenzaprine (FLEXERIL) 5 mg tablet; Take 1 tablet (5 mg total) by mouth daily at bedtime as needed for muscle spasms    Bunion of great toe of left foot  -     Ambulatory referral to Podiatry; Future          Subjective:    Patient ID: Saskia Clark is a 26 y o  female  HPI   31yo F presents to the office for eval of acute on chronic low back pain   - h/o sciatic pain in the past decade  - (+) b/l low back pain (R>L) with sciatic pain   - is a dental hygienest  - has been taking ibuprofen QD-BID with relief   - [facility]   - gets massages 2x/month   - denies loss of bowel or bladder function   - also with bunion on L-big toe which is becoming painful       The following portions of the patient's history were reviewed and updated as appropriate: allergies, current medications, past family history, past medical history, past social history, past surgical history and problem list     Review of Systems  as per HPI    Objective:  /76 (BP Location: Right arm, Patient Position: Sitting, Cuff Size: Standard)   Pulse 63   Temp 97 6 °F (36 4 °C) (Tympanic)   Resp 16   Ht 5' 8" (1 727 m)   Wt 70 3 kg (155 lb)   LMP 07/20/2021   SpO2 98%   BMI 23 57 kg/m²    Physical Exam  Vitals reviewed  Constitutional:       General: She is not in acute distress  Appearance: Normal appearance  She is normal weight  She is not ill-appearing, toxic-appearing or diaphoretic  HENT:      Head: Normocephalic and atraumatic  Right Ear: External ear normal       Left Ear: External ear normal    Eyes:      General: No scleral icterus  Right eye: No discharge  Left eye: No discharge        Extraocular Movements: Extraocular movements intact  Conjunctiva/sclera: Conjunctivae normal    Pulmonary:      Effort: Pulmonary effort is normal    Musculoskeletal:         General: No swelling, deformity or signs of injury  Cervical back: Normal range of motion  Right lower leg: No edema  Left lower leg: No edema  Comments: (+) tight paraspinals    Skin:     Comments: Bunion of L-big toe    Neurological:      General: No focal deficit present  Mental Status: She is alert and oriented to person, place, and time     Psychiatric:         Mood and Affect: Mood normal          Behavior: Behavior normal

## 2021-08-13 ENCOUNTER — HOSPITAL ENCOUNTER (OUTPATIENT)
Dept: MAMMOGRAPHY | Facility: CLINIC | Age: 31
Discharge: HOME | End: 2021-08-13
Payer: COMMERCIAL

## 2021-08-13 ENCOUNTER — HOSPITAL ENCOUNTER (OUTPATIENT)
Dept: ULTRASOUND IMAGING | Facility: CLINIC | Age: 31
Discharge: HOME | End: 2021-08-13
Payer: COMMERCIAL

## 2021-08-13 VITALS — WEIGHT: 155 LBS | BODY MASS INDEX: 23.49 KG/M2 | HEIGHT: 68 IN

## 2021-08-13 DIAGNOSIS — N63.20 LUMP OF LEFT BREAST: ICD-10-CM

## 2021-08-13 DIAGNOSIS — N63.20 MASS OF LEFT BREAST: ICD-10-CM

## 2021-08-13 PROCEDURE — 76642 ULTRASOUND BREAST LIMITED: CPT

## 2021-08-16 ENCOUNTER — TELEPHONE (OUTPATIENT)
Dept: FAMILY MEDICINE CLINIC | Facility: CLINIC | Age: 31
End: 2021-08-16

## 2021-08-16 NOTE — TELEPHONE ENCOUNTER
----- Message from Wanda Trinh DO sent at 8/15/2021  9:39 PM EDT -----  Please call the patient regarding her abnormal result  Diagnostic ultrasound showed probable fibroadenoma of the left breast   Radiologist recommended repeat diagnostic ultrasound in 6 months    I believe that that has been scheduled already

## 2021-10-08 ENCOUNTER — CLINICAL SUPPORT (OUTPATIENT)
Dept: OBGYN CLINIC | Facility: CLINIC | Age: 31
End: 2021-10-08
Payer: COMMERCIAL

## 2021-10-08 VITALS — BODY MASS INDEX: 24.12 KG/M2 | SYSTOLIC BLOOD PRESSURE: 124 MMHG | WEIGHT: 158.6 LBS | DIASTOLIC BLOOD PRESSURE: 74 MMHG

## 2021-10-08 DIAGNOSIS — Z23 NEED FOR HPV VACCINE: Primary | ICD-10-CM

## 2021-10-08 PROCEDURE — 90471 IMMUNIZATION ADMIN: CPT | Performed by: OBSTETRICS & GYNECOLOGY

## 2021-10-08 PROCEDURE — 90651 9VHPV VACCINE 2/3 DOSE IM: CPT | Performed by: OBSTETRICS & GYNECOLOGY

## 2022-02-18 ENCOUNTER — CLINICAL SUPPORT (OUTPATIENT)
Dept: OBGYN CLINIC | Facility: CLINIC | Age: 32
End: 2022-02-18
Payer: COMMERCIAL

## 2022-02-18 ENCOUNTER — HOSPITAL ENCOUNTER (OUTPATIENT)
Dept: ULTRASOUND IMAGING | Facility: CLINIC | Age: 32
Discharge: HOME | End: 2022-02-18
Payer: COMMERCIAL

## 2022-02-18 VITALS — BODY MASS INDEX: 24.78 KG/M2 | WEIGHT: 163 LBS

## 2022-02-18 VITALS — HEIGHT: 68 IN | WEIGHT: 162.92 LBS | BODY MASS INDEX: 24.69 KG/M2

## 2022-02-18 DIAGNOSIS — Z23 NEED FOR HPV VACCINATION: Primary | ICD-10-CM

## 2022-02-18 DIAGNOSIS — N63.20 MASS OF LEFT BREAST: ICD-10-CM

## 2022-02-18 PROCEDURE — 90651 9VHPV VACCINE 2/3 DOSE IM: CPT | Performed by: OBSTETRICS & GYNECOLOGY

## 2022-02-18 PROCEDURE — 90471 IMMUNIZATION ADMIN: CPT | Performed by: OBSTETRICS & GYNECOLOGY

## 2022-02-18 PROCEDURE — 96372 THER/PROPH/DIAG INJ SC/IM: CPT | Performed by: OBSTETRICS & GYNECOLOGY

## 2022-02-18 PROCEDURE — 76642 ULTRASOUND BREAST LIMITED: CPT

## 2022-02-21 ENCOUNTER — TELEPHONE (OUTPATIENT)
Dept: FAMILY MEDICINE CLINIC | Facility: CLINIC | Age: 32
End: 2022-02-21

## 2022-02-21 NOTE — TELEPHONE ENCOUNTER
----- Message from Jose Joseph DO sent at 2/21/2022  7:57 AM EST -----  Please call the patient regarding her abnormal result  Left breast shows a stable fibroadenoma  Radiologist recommends diagnostic mammogram in 6 months for both breasts and an ultrasound in 6 months for her left breast to ensure stability    I see that she is scheduled with a different family physician who will have to order those tests

## 2022-04-01 PROBLEM — Z00.00 PHYSICAL EXAM, ANNUAL: Status: RESOLVED | Noted: 2018-08-07 | Resolved: 2022-04-01

## 2022-04-01 PROBLEM — Z11.1 SCREENING FOR TUBERCULOSIS: Status: RESOLVED | Noted: 2018-08-07 | Resolved: 2022-04-01

## 2022-04-01 PROBLEM — N63.20 LUMP OF LEFT BREAST: Status: RESOLVED | Noted: 2021-01-27 | Resolved: 2022-04-01

## 2022-04-21 ENCOUNTER — OFFICE VISIT (OUTPATIENT)
Dept: FAMILY MEDICINE CLINIC | Facility: CLINIC | Age: 32
End: 2022-04-21
Payer: COMMERCIAL

## 2022-04-21 VITALS
SYSTOLIC BLOOD PRESSURE: 90 MMHG | HEART RATE: 72 BPM | WEIGHT: 158.4 LBS | DIASTOLIC BLOOD PRESSURE: 58 MMHG | HEIGHT: 67 IN | BODY MASS INDEX: 24.86 KG/M2 | TEMPERATURE: 97.8 F | OXYGEN SATURATION: 100 % | RESPIRATION RATE: 12 BRPM

## 2022-04-21 DIAGNOSIS — M54.42 CHRONIC BILATERAL LOW BACK PAIN WITH BILATERAL SCIATICA: ICD-10-CM

## 2022-04-21 DIAGNOSIS — E78.00 HIGH CHOLESTEROL: ICD-10-CM

## 2022-04-21 DIAGNOSIS — G89.29 CHRONIC BILATERAL LOW BACK PAIN WITH BILATERAL SCIATICA: ICD-10-CM

## 2022-04-21 DIAGNOSIS — Z80.3 FAMILY HISTORY OF BREAST CANCER: ICD-10-CM

## 2022-04-21 DIAGNOSIS — M54.41 CHRONIC BILATERAL LOW BACK PAIN WITH BILATERAL SCIATICA: ICD-10-CM

## 2022-04-21 DIAGNOSIS — R53.83 TIRED: ICD-10-CM

## 2022-04-21 DIAGNOSIS — N63.20 MASS OF LEFT BREAST, UNSPECIFIED QUADRANT: ICD-10-CM

## 2022-04-21 DIAGNOSIS — N92.6 IRREGULAR PERIODS: ICD-10-CM

## 2022-04-21 DIAGNOSIS — Z11.4 SCREENING FOR HIV (HUMAN IMMUNODEFICIENCY VIRUS): ICD-10-CM

## 2022-04-21 DIAGNOSIS — Z00.00 WELL ADULT EXAM: Primary | ICD-10-CM

## 2022-04-21 PROCEDURE — 3008F BODY MASS INDEX DOCD: CPT | Performed by: FAMILY MEDICINE

## 2022-04-21 PROCEDURE — 1036F TOBACCO NON-USER: CPT | Performed by: FAMILY MEDICINE

## 2022-04-21 PROCEDURE — 3725F SCREEN DEPRESSION PERFORMED: CPT | Performed by: FAMILY MEDICINE

## 2022-04-21 PROCEDURE — 99395 PREV VISIT EST AGE 18-39: CPT | Performed by: FAMILY MEDICINE

## 2022-04-21 RX ORDER — NAPROXEN 500 MG/1
500 TABLET ORAL 2 TIMES DAILY WITH MEALS
Qty: 28 TABLET | Refills: 5 | Status: SHIPPED | OUTPATIENT
Start: 2022-04-21

## 2022-04-21 NOTE — PATIENT INSTRUCTIONS
oscal or caltrate twice a day (800 - 1000 mg calcium with about 1000 iu vitamin D)    prenatal vitamin or Folic Acid recommendations  Folic acid is important early in pregnancy for the prevention of neural tube defects  It is recommended to take 400-800 mg daily while trying to get pregnant and throughout pregnancy  This is included in prenatal vitamins or you can buy a separate supplement if you are taking a regular multivitamin       Flonase nasal spray

## 2022-04-21 NOTE — PROGRESS NOTES
Assessment/Plan:     1  Well adult exam  See below     2  Chronic bilateral low back pain with bilateral sciatica  Sees chiropractor  Refill naprosyn  Call if she wishes for referral to PT    - naproxen (NAPROSYN) 500 mg tablet; Take 1 tablet (500 mg total) by mouth 2 (two) times a day with meals  Dispense: 28 tablet; Refill: 5    3  Tired  Update labs   - TSH, 3rd generation with Free T4 reflex; Future  - CBC and differential; Future    4  Irregular periods  Update labs   - TSH, 3rd generation with Free T4 reflex; Future  - CBC and differential; Future    5  High cholesterol  Update labs   - Comprehensive metabolic panel; Future  - Lipid panel; Future    6  Mass of left breast, unspecified quadrant  Being followed with diagnostic mammo and US   - Mammo diagnostic bilateral w 3d & cad; Future  - Mammo diagnostic bilateral w 3d & cad; Future  - US breast left limited (diagnostic); Future    7  Screening for HIV (human immunodeficiency virus)  Update labs   - HIV 1/2 Antigen/Antibody (4th Generation) w Reflex [facility]; Future    8  Family history of breast cancer  Update labs   - Mammo diagnostic bilateral w 3d & cad; Future  - US breast left limited (diagnostic); Future      Well adult exam  ·         Continue healthy diet   ·         Encourage exercise 4 times a week or more for minimum 30 minutes  ·         Continue to see dentist, wear seatbelt  ·         Health maintenance reviewed and up-to-date  Reviewed age appropriate health maintenance screenings and immunizations that are due, risks and benefits of these     Since not using birth control recommend start folic acid   Health Maintenance   Topic Date Due    COVID-19 Vaccine (1) Never done    HIV Screening  Never done    Influenza Vaccine (1) 06/30/2022 (Originally 9/1/2021)    Depression Screening  04/21/2023    BMI: Adult  04/21/2023    Annual Physical  04/21/2023    Cervical Cancer Screening  08/02/2026    DTaP,Tdap,and Td Vaccines (2 - Td or Tdap) 05/31/2027    Hepatitis C Screening  Completed    Pneumococcal Vaccine: Pediatrics (0 to 5 Years) and At-Risk Patients (6 to 59 Years)  Aged Out    HIB Vaccine  Aged Out    Hepatitis B Vaccine  Aged Out    IPV Vaccine  Aged Out    Hepatitis A Vaccine  Aged Out    Meningococcal ACWY Vaccine  Aged Out    HPV Vaccine  Aged Out     Return in about 1 year (around 4/21/2023) for Annual physical     Subjective:    SHEKHAR Villafuerte is a 33 y o  female who presents today for a physical      Chief Complaint   Patient presents with    Physical Exam     NO CONCERNS DOES NOT NEED TO ADDRESS ADD    Other     HIV PENDED     Immunizations     DECLINED FLU VACCINE - WILL UPLOAD COVID CARD TO PORTAL      PHQ-2/9 Depression Screening    Little interest or pleasure in doing things: 0 - not at all  Feeling down, depressed, or hopeless: 0 - not at all  PHQ-2 Score: 0  PHQ-2 Interpretation: Negative depression screen        ---Above per clinical staff & reviewed   ---  Patient here today for a physical:    Diet: yes   Exercise:  4 times a week - workout at lunch   Dental visits:  Yes   Seatbelt: yes     Concerns today:  Boyfriend Eduar Ingram, 7 years  2 bulldogs   Was having a lot of back pain, numbness down her legs  Uses naproxen as needed   Did help - none left   Did not use muscle relaxer - groggy in am   Heating pad nightly   Did not do PT   Sees chiropractor Dr Allan Simmons on a regular schedule   Did x-rays there   No numbness since   Irregular periods, tired, worries about thyroid   They use withdrawal - considering him having vasectomy   Has a breast lump that is being watched  - 12 mm being watched         The following portions of the patient's history were reviewed and updated as appropriate: allergies, current medications, past family history, past medical history, past social history, past surgical history and problem list      Current Medications:  Current Outpatient Medications   Medication Sig Dispense Refill    naproxen (NAPROSYN) 500 mg tablet Take 1 tablet (500 mg total) by mouth 2 (two) times a day with meals 28 tablet 5     No current facility-administered medications for this visit  Objective:      BP 90/58   Pulse 72   Temp 97 8 °F (36 6 °C)   Resp 12   Ht 5' 6 85" (1 698 m)   Wt 71 8 kg (158 lb 6 4 oz)   SpO2 100%   BMI 24 92 kg/m²   BP Readings from Last 3 Encounters:   04/21/22 90/58   10/08/21 124/74   08/11/21 110/76     Wt Readings from Last 3 Encounters:   04/21/22 71 8 kg (158 lb 6 4 oz)   02/18/22 73 9 kg (162 lb 14 7 oz)   02/18/22 73 9 kg (163 lb)       Review of Systems  ROS:  all others negative - no chest pain, SOB, normal urine and bowels  no GERD  sleeping well  mood good  Physical Exam   Constitutional: she appears well-developed and well-nourished  HENT: Head: Normocephalic  Right Ear: External ear normal  Tympanic membrane normal    Left Ear: External ear normal  Tympanic membrane normal    Nose: Nose normal  No mucosal edema, No rhinorrhea  Right sinus exhibits no maxillary sinus tenderness  Left sinus exhibits no maxillary sinus tenderness  Mouth/Throat: Oropharynx is clear and moist    Eyes: Normal conjunctiva  No erythema  No discharge  Neck: No pain on exam  Neck supple  Cardiovascular: Normal rate, regular rhythm and normal heart sounds  Pulmonary/Chest: Effort normal and breath sounds normal  No wheezes  No rales  No rhonchi  Abdominal: Soft  Bowel sounds are normal  There is no tenderness  Musculoskeletal: she exhibits no edema  Lymphadenopathy: she has no cervical adenopathy  Neurological: she  is alert and oriented to person, place, and time  Skin: Skin is warm and dry  No rashes  Psychiatric: she  has a normal mood and affect  her behavior is normal  Thought content normal    Vitals reviewed  Depression Screening and Follow-up Plan: Patient was screened for depression during today's encounter   They screened negative with a PHQ-2 score of 0

## 2022-09-16 ENCOUNTER — HOSPITAL ENCOUNTER (OUTPATIENT)
Dept: ULTRASOUND IMAGING | Facility: CLINIC | Age: 32
Discharge: HOME | End: 2022-09-16
Payer: COMMERCIAL

## 2022-09-16 ENCOUNTER — HOSPITAL ENCOUNTER (OUTPATIENT)
Dept: MAMMOGRAPHY | Facility: CLINIC | Age: 32
Discharge: HOME | End: 2022-09-16
Payer: COMMERCIAL

## 2022-09-16 VITALS — HEIGHT: 67 IN | WEIGHT: 158 LBS | BODY MASS INDEX: 24.8 KG/M2

## 2022-09-16 DIAGNOSIS — Z80.3 FAMILY HISTORY OF BREAST CANCER: ICD-10-CM

## 2022-09-16 DIAGNOSIS — N63.20 MASS OF LEFT BREAST, UNSPECIFIED QUADRANT: ICD-10-CM

## 2022-09-16 PROCEDURE — 76642 ULTRASOUND BREAST LIMITED: CPT

## 2022-09-16 PROCEDURE — G0279 TOMOSYNTHESIS, MAMMO: HCPCS

## 2022-09-16 PROCEDURE — 77066 DX MAMMO INCL CAD BI: CPT

## 2022-11-23 LAB
ALBUMIN SERPL-MCNC: 4.2 G/DL (ref 3.6–5.1)
ALBUMIN/GLOB SERPL: 1.8 (CALC) (ref 1–2.5)
ALP SERPL-CCNC: 38 U/L (ref 31–125)
ALT SERPL-CCNC: 12 U/L (ref 6–29)
AST SERPL-CCNC: 15 U/L (ref 10–30)
BASOPHILS # BLD AUTO: 38 CELLS/UL (ref 0–200)
BASOPHILS NFR BLD AUTO: 0.6 %
BILIRUB SERPL-MCNC: 0.6 MG/DL (ref 0.2–1.2)
BUN SERPL-MCNC: 13 MG/DL (ref 7–25)
BUN/CREAT SERPL: NORMAL (CALC) (ref 6–22)
CALCIUM SERPL-MCNC: 9.6 MG/DL (ref 8.6–10.2)
CHLORIDE SERPL-SCNC: 103 MMOL/L (ref 98–110)
CHOLEST SERPL-MCNC: 165 MG/DL
CHOLEST/HDLC SERPL: 2.5 (CALC)
CO2 SERPL-SCNC: 28 MMOL/L (ref 20–32)
CREAT SERPL-MCNC: 0.63 MG/DL (ref 0.5–0.97)
EOSINOPHIL # BLD AUTO: 57 CELLS/UL (ref 15–500)
EOSINOPHIL NFR BLD AUTO: 0.9 %
ERYTHROCYTE [DISTWIDTH] IN BLOOD BY AUTOMATED COUNT: 11.9 % (ref 11–15)
GFR/BSA.PRED SERPLBLD CYS-BASED-ARV: 119 ML/MIN/1.73M2
GLOBULIN SER CALC-MCNC: 2.4 G/DL (CALC) (ref 1.9–3.7)
GLUCOSE SERPL-MCNC: 79 MG/DL (ref 65–99)
HCT VFR BLD AUTO: 36.9 % (ref 35–45)
HDLC SERPL-MCNC: 67 MG/DL
HGB BLD-MCNC: 12.5 G/DL (ref 11.7–15.5)
HIV 1+2 AB+HIV1 P24 AG SERPL QL IA: NORMAL
LDLC SERPL CALC-MCNC: 82 MG/DL (CALC)
LYMPHOCYTES # BLD AUTO: 2495 CELLS/UL (ref 850–3900)
LYMPHOCYTES NFR BLD AUTO: 39.6 %
MCH RBC QN AUTO: 30.1 PG (ref 27–33)
MCHC RBC AUTO-ENTMCNC: 33.9 G/DL (ref 32–36)
MCV RBC AUTO: 88.9 FL (ref 80–100)
MONOCYTES # BLD AUTO: 391 CELLS/UL (ref 200–950)
MONOCYTES NFR BLD AUTO: 6.2 %
NEUTROPHILS # BLD AUTO: 3320 CELLS/UL (ref 1500–7800)
NEUTROPHILS NFR BLD AUTO: 52.7 %
NONHDLC SERPL-MCNC: 98 MG/DL (CALC)
PLATELET # BLD AUTO: 296 THOUSAND/UL (ref 140–400)
PMV BLD REES-ECKER: 9 FL (ref 7.5–12.5)
POTASSIUM SERPL-SCNC: 4.1 MMOL/L (ref 3.5–5.3)
PROT SERPL-MCNC: 6.6 G/DL (ref 6.1–8.1)
RBC # BLD AUTO: 4.15 MILLION/UL (ref 3.8–5.1)
SODIUM SERPL-SCNC: 139 MMOL/L (ref 135–146)
TRIGL SERPL-MCNC: 84 MG/DL
TSH SERPL-ACNC: 1.02 MIU/L
WBC # BLD AUTO: 6.3 THOUSAND/UL (ref 3.8–10.8)

## 2022-11-25 ENCOUNTER — TELEPHONE (OUTPATIENT)
Dept: FAMILY MEDICINE CLINIC | Facility: CLINIC | Age: 32
End: 2022-11-25

## 2022-11-25 NOTE — TELEPHONE ENCOUNTER
Patient called stating she has a form for work to be completed for [employer]  Patient is leaving work and lives close to office, so dropping off  Last OV 4/21/22  Form to be reviewed to confirm have what is needed in chart to complete  Asking to have completed by 11/30/22

## 2022-11-25 NOTE — TELEPHONE ENCOUNTER
Patient dropped off form, bottom portion needs completion for lab results and other vitals   Form given to Clinical Team  Asking to fax when complete to [phone]

## 2023-04-25 ENCOUNTER — OFFICE VISIT (OUTPATIENT)
Dept: FAMILY MEDICINE CLINIC | Facility: CLINIC | Age: 33
End: 2023-04-25

## 2023-04-25 VITALS
BODY MASS INDEX: 24.64 KG/M2 | SYSTOLIC BLOOD PRESSURE: 100 MMHG | RESPIRATION RATE: 16 BRPM | WEIGHT: 157 LBS | OXYGEN SATURATION: 100 % | HEIGHT: 67 IN | DIASTOLIC BLOOD PRESSURE: 64 MMHG | TEMPERATURE: 98.7 F | HEART RATE: 71 BPM

## 2023-04-25 DIAGNOSIS — E78.00 HIGH CHOLESTEROL: ICD-10-CM

## 2023-04-25 DIAGNOSIS — Z00.00 WELL ADULT EXAM: Primary | ICD-10-CM

## 2023-04-25 DIAGNOSIS — Z83.49 FAMILY HISTORY OF THYROID DISEASE: ICD-10-CM

## 2023-04-25 NOTE — PROGRESS NOTES
Assessment/Plan:     Jay Jay Hdz was seen today for physical exam and hm  Diagnoses and all orders for this visit:    Well adult exam  -     Comprehensive metabolic panel; Future  -     Lipid panel; Future  -     TSH, 3rd generation with Free T4 reflex; Future    High cholesterol  -     Comprehensive metabolic panel; Future  -     Lipid panel; Future  -     TSH, 3rd generation with Free T4 reflex; Future    Family history of thyroid disease  -     Comprehensive metabolic panel; Future  -     Lipid panel; Future  -     TSH, 3rd generation with Free T4 reflex; Future       Well adult exam  ·         Continue healthy diet   ·         Encourage exercise 4 times a week or more for minimum 30 minutes  ·         Continue to see dentist, wear seatbelt  ·         Health maintenance reviewed and up-to-date - declines flu and COVID  Update fasting blood work - results via [address]  Will call for referral to podiatry when needed for bunions  Reviewed age appropriate health maintenance screenings and immunizations that are due, risks and benefits of these  Health Maintenance   Topic Date Due   • COVID-19 Vaccine (1) Never done   • Influenza Vaccine (1) 09/01/2022   • Depression Screening  04/25/2024   • BMI: Adult  04/25/2024   • Annual Physical  04/25/2024   • Cervical Cancer Screening  08/02/2026   • DTaP,Tdap,and Td Vaccines (2 - Td or Tdap) 05/31/2027   • HIV Screening  Completed   • Hepatitis C Screening  Completed   • Pneumococcal Vaccine: Pediatrics (0 to 5 Years) and At-Risk Patients (6 to 59 Years)  Aged Out   • HIB Vaccine  Aged Out   • IPV Vaccine  Aged Out   • Hepatitis A Vaccine  Aged Out   • Meningococcal ACWY Vaccine  Aged Out   • HPV Vaccine  Aged Out     No follow-ups on file      Subjective:    HPI     Jay Jay Hdz is a 27 y o  female who presents today for a physical      Chief Complaint   Patient presents with   • Physical Exam   • HM     Declined COVID, and Flu          Patient Care Team:  Dacia Funk DO "as PCP - General (Family Medicine)  [address] [place] [address], DO (Obstetrics and Gynecology)    PHQ-2/9 Depression Screening    Little interest or pleasure in doing things: 0 - not at all  Feeling down, depressed, or hopeless: 0 - not at all  PHQ-2 Score: 0  PHQ-2 Interpretation: Negative depression screen        ?    ---Above per clinical staff & reviewed  ---  Patient here today for a physical:    Diet: healthy   Exercise:  Yes   Dental visits:  Yes   Seatbelt: yes   Sleeping 8 hours     Concerns today:  8 years in May with her BF  His second daughter is going to [employer]  Bunion bilateral, worse on left, painful  Doing well  Thinking of vasectomy for him for [facility]  The following portions of the patient's history were reviewed and updated as appropriate: allergies, current medications, past family history, past medical history, past social history, past surgical history and problem list      Current Medications:  Current Outpatient Medications   Medication Sig Dispense Refill   • naproxen (NAPROSYN) 500 mg tablet Take 1 tablet (500 mg total) by mouth 2 (two) times a day with meals 28 tablet 5     No current facility-administered medications for this visit  Objective:      /64   Pulse 71   Temp 98 7 °F (37 1 °C)   Resp 16   Ht 5' 6 85\" (1 698 m)   Wt 71 2 kg (157 lb)   SpO2 100%   BMI 24 70 kg/m²   BP Readings from Last 3 Encounters:   04/25/23 100/64   04/21/22 90/58   10/08/21 124/74     Wt Readings from Last 3 Encounters:   04/25/23 71 2 kg (157 lb)   09/16/22 71 7 kg (158 lb)   04/21/22 71 8 kg (158 lb 6 4 oz)       Review of Systems  ROS:  all others negative - no chest pain, SOB, normal urine and bowels  no GERD  sleeping well  mood good  Physical Exam   Constitutional: she appears well-developed and well-nourished  HENT: Head: Normocephalic     Right Ear: External ear normal  Tympanic membrane normal    Left Ear: External ear normal  Tympanic membrane normal    Nose: Nose " normal  No mucosal edema, No rhinorrhea  Right sinus exhibits no maxillary sinus tenderness  Left sinus exhibits no maxillary sinus tenderness  Mouth/Throat: Oropharynx is clear and moist    Eyes: Normal conjunctiva  No erythema  No discharge  Neck: No pain on exam  Neck supple  Cardiovascular: Normal rate, regular rhythm and normal heart sounds  Pulmonary/Chest: Effort normal and breath sounds normal  No wheezes  No rales  No rhonchi  Abdominal: Soft  Bowel sounds are normal  There is no tenderness  Musculoskeletal: she exhibits no edema  Lymphadenopathy: she has no cervical adenopathy  Neurological: she  is alert and oriented to person, place, and time  Skin: Skin is warm and dry  No rashes  Psychiatric: she  has a normal mood and affect  her behavior is normal  Thought content normal    Vitals reviewed  Depression Screening and Follow-up Plan: Patient was screened for depression during today's encounter  They screened negative with a PHQ-2 score of 0

## 2023-06-29 ENCOUNTER — OFFICE VISIT (OUTPATIENT)
Dept: FAMILY MEDICINE CLINIC | Facility: CLINIC | Age: 33
End: 2023-06-29
Payer: COMMERCIAL

## 2023-06-29 ENCOUNTER — HOSPITAL ENCOUNTER (OUTPATIENT)
Dept: RADIOLOGY | Facility: HOSPITAL | Age: 33
Discharge: HOME | End: 2023-06-29
Payer: COMMERCIAL

## 2023-06-29 VITALS
BODY MASS INDEX: 23.86 KG/M2 | HEIGHT: 67 IN | WEIGHT: 152 LBS | TEMPERATURE: 98 F | HEART RATE: 75 BPM | RESPIRATION RATE: 16 BRPM | OXYGEN SATURATION: 100 % | SYSTOLIC BLOOD PRESSURE: 102 MMHG | DIASTOLIC BLOOD PRESSURE: 72 MMHG

## 2023-06-29 DIAGNOSIS — M54.41 CHRONIC BILATERAL LOW BACK PAIN WITH BILATERAL SCIATICA: ICD-10-CM

## 2023-06-29 DIAGNOSIS — M79.672 FOOT PAIN, LEFT: ICD-10-CM

## 2023-06-29 DIAGNOSIS — G89.29 CHRONIC BILATERAL LOW BACK PAIN WITH BILATERAL SCIATICA: ICD-10-CM

## 2023-06-29 DIAGNOSIS — M79.672 FOOT PAIN, LEFT: Primary | ICD-10-CM

## 2023-06-29 DIAGNOSIS — M54.42 CHRONIC BILATERAL LOW BACK PAIN WITH BILATERAL SCIATICA: ICD-10-CM

## 2023-06-29 PROCEDURE — 99213 OFFICE O/P EST LOW 20 MIN: CPT | Performed by: FAMILY MEDICINE

## 2023-06-29 PROCEDURE — 73630 X-RAY EXAM OF FOOT: CPT

## 2023-06-29 RX ORDER — NAPROXEN 500 MG/1
500 TABLET ORAL 2 TIMES DAILY WITH MEALS
Qty: 28 TABLET | Refills: 5 | Status: SHIPPED | OUTPATIENT
Start: 2023-06-29

## 2023-06-29 NOTE — PROGRESS NOTES
" Assessment/Plan:   1  Foot pain, left  -     XR foot 3+ vw left; Future; Expected date: 06/29/2023  -     Ambulatory referral to Podiatry; Future    2  Chronic bilateral low back pain with bilateral sciatica  -     naproxen (NAPROSYN) 500 mg tablet; Take 1 tablet (500 mg total) by mouth 2 (two) times a day with meals     use naprosyn twice a day pain   Ice area  See podiatry   Stat x-ray today       Patient Instructions   [facility]   [address] [facility] [place], [address] [facility]   [address] [facility]  [address], [facility], [address] [facility] [address] [address]  [address], [facility], [address]   823 [facility], [facility], [address]   24 [facility], [facility] 9462 81 [facility], [facility], [address] [facility]  [phone]    [facility]   [address] [facility] [address], [facility], [address] [facility]   [address] [facility] and [address] [facility] [address]  [address], [address], [facility], [facility] 3  [phone]    No follow-ups on file  Subjective:    HPI Agustina Soulier is a 27 y o  female who presents with:  Chief Complaint    Foot Injury       HPI     Foot Injury     Additional comments: Left foot pain           Last edited by Hector Kc on 6/29/2023  9:17 AM         ---Above per clinical staff & reviewed  ---        Today:  Woke up today and stepped down and sharp pain in her foot   10/10 with stepping on it       The following portions of the patient's history were reviewed and updated as appropriate: allergies, current medications, past family history, past medical history, past social history, past surgical history and problem list   Review of Systems  ROS:  all others negative - no chest pain, SOB, normal urine and bowels  no GERD  sleeping well  mood good     Objective:    /72   Pulse 75   Temp 98 °F (36 7 °C)   Resp 16   Ht 5' 6 85\" (1 698 m)   Wt 68 9 kg (152 lb)   SpO2 100%   BMI 23 91 kg/m²   Wt Readings from Last 3 Encounters:   06/29/23 68 9 kg (152 lb)   04/25/23 71 2 kg (157 " lb)   09/16/22 71 7 kg (158 lb)     BP Readings from Last 3 Encounters:   06/29/23 102/72   04/25/23 100/64   04/21/22 90/58       Current Medications:  Current Outpatient Medications   Medication Sig Dispense Refill   • naproxen (NAPROSYN) 500 mg tablet Take 1 tablet (500 mg total) by mouth 2 (two) times a day with meals 28 tablet 5     No current facility-administered medications for this visit  Physical Exam  Musculoskeletal:        Feet:       Comments: Known bunions  Constitutional: she appears well-developed and well-nourished  HENT: Head: Normocephalic  Lymphadenopathy: she has no cervical adenopathy  Neurological: she is alert and oriented to person, place, and time  Skin: Skin is warm and dry  Psychiatric: she has a normal mood and affect   her behavior is normal

## 2023-06-29 NOTE — PATIENT INSTRUCTIONS
[facility]   59 [facility] [place], [address] [place]   [address], [facility], [address]  29[address] [facility], [facility], [address]   823 [facility], [facility], [address]    [address], [facility], [address] [facility]  [phone]    [facility]   [address] [facility] [address], [place], [address] [place]   7[address] [facility] and [address] [facility] [address]  [address], [address], [facility], [facility] 3  [phone]

## 2024-04-25 ENCOUNTER — TELEPHONE (OUTPATIENT)
Dept: FAMILY MEDICINE CLINIC | Facility: CLINIC | Age: 34
End: 2024-04-25

## 2024-05-31 ENCOUNTER — TELEPHONE (OUTPATIENT)
Dept: INTERNAL MEDICINE CLINIC | Facility: CLINIC | Age: 34
End: 2024-05-31

## 2024-07-29 ENCOUNTER — TELEPHONE (OUTPATIENT)
Age: 34
End: 2024-07-29

## 2024-09-14 PROBLEM — R92.30 DENSE BREASTS: Status: ACTIVE | Noted: 2024-09-14

## 2024-09-14 PROBLEM — Z80.3 FAMILY HISTORY OF BREAST CANCER: Status: ACTIVE | Noted: 2024-09-14

## 2024-09-16 ENCOUNTER — APPOINTMENT (OUTPATIENT)
Dept: LAB | Facility: CLINIC | Age: 34
End: 2024-09-16
Payer: COMMERCIAL

## 2024-09-16 ENCOUNTER — OFFICE VISIT (OUTPATIENT)
Dept: FAMILY MEDICINE CLINIC | Facility: CLINIC | Age: 34
End: 2024-09-16
Payer: COMMERCIAL

## 2024-09-16 VITALS
RESPIRATION RATE: 16 BRPM | OXYGEN SATURATION: 99 % | BODY MASS INDEX: 25.33 KG/M2 | SYSTOLIC BLOOD PRESSURE: 108 MMHG | HEIGHT: 67 IN | HEART RATE: 61 BPM | DIASTOLIC BLOOD PRESSURE: 76 MMHG | WEIGHT: 161.4 LBS | TEMPERATURE: 98.2 F

## 2024-09-16 DIAGNOSIS — Z00.00 WELL ADULT EXAM: ICD-10-CM

## 2024-09-16 DIAGNOSIS — M54.42 CHRONIC BILATERAL LOW BACK PAIN WITH BILATERAL SCIATICA: ICD-10-CM

## 2024-09-16 DIAGNOSIS — Z83.49 FAMILY HISTORY OF THYROID DISEASE: ICD-10-CM

## 2024-09-16 DIAGNOSIS — G89.29 CHRONIC BILATERAL LOW BACK PAIN WITH BILATERAL SCIATICA: ICD-10-CM

## 2024-09-16 DIAGNOSIS — Z80.3 FAMILY HISTORY OF BREAST CANCER: ICD-10-CM

## 2024-09-16 DIAGNOSIS — Z00.00 WELL ADULT EXAM: Primary | ICD-10-CM

## 2024-09-16 DIAGNOSIS — R92.30 DENSE BREASTS: ICD-10-CM

## 2024-09-16 DIAGNOSIS — M54.41 CHRONIC BILATERAL LOW BACK PAIN WITH BILATERAL SCIATICA: ICD-10-CM

## 2024-09-16 LAB
ALBUMIN SERPL BCG-MCNC: 4.3 G/DL (ref 3.5–5)
ALP SERPL-CCNC: 36 U/L (ref 34–104)
ALT SERPL W P-5'-P-CCNC: 13 U/L (ref 7–52)
ANION GAP SERPL CALCULATED.3IONS-SCNC: 6 MMOL/L (ref 4–13)
AST SERPL W P-5'-P-CCNC: 13 U/L (ref 13–39)
BILIRUB SERPL-MCNC: 0.71 MG/DL (ref 0.2–1)
BUN SERPL-MCNC: 13 MG/DL (ref 5–25)
CALCIUM SERPL-MCNC: 9.4 MG/DL (ref 8.4–10.2)
CHLORIDE SERPL-SCNC: 105 MMOL/L (ref 96–108)
CHOLEST SERPL-MCNC: 183 MG/DL
CO2 SERPL-SCNC: 26 MMOL/L (ref 21–32)
CREAT SERPL-MCNC: 0.75 MG/DL (ref 0.6–1.3)
GFR SERPL CREATININE-BSD FRML MDRD: 103 ML/MIN/1.73SQ M
GLUCOSE P FAST SERPL-MCNC: 96 MG/DL (ref 65–99)
HDLC SERPL-MCNC: 68 MG/DL
LDLC SERPL CALC-MCNC: 84 MG/DL (ref 0–100)
NONHDLC SERPL-MCNC: 115 MG/DL
POTASSIUM SERPL-SCNC: 3.8 MMOL/L (ref 3.5–5.3)
PROT SERPL-MCNC: 7 G/DL (ref 6.4–8.4)
SODIUM SERPL-SCNC: 137 MMOL/L (ref 135–147)
TRIGL SERPL-MCNC: 156 MG/DL
TSH SERPL DL<=0.05 MIU/L-ACNC: 0.81 UIU/ML (ref 0.45–4.5)

## 2024-09-16 PROCEDURE — 36415 COLL VENOUS BLD VENIPUNCTURE: CPT

## 2024-09-16 PROCEDURE — 99214 OFFICE O/P EST MOD 30 MIN: CPT | Performed by: FAMILY MEDICINE

## 2024-09-16 PROCEDURE — 80053 COMPREHEN METABOLIC PANEL: CPT

## 2024-09-16 PROCEDURE — 84443 ASSAY THYROID STIM HORMONE: CPT

## 2024-09-16 PROCEDURE — 99395 PREV VISIT EST AGE 18-39: CPT | Performed by: FAMILY MEDICINE

## 2024-09-16 PROCEDURE — 80061 LIPID PANEL: CPT

## 2024-09-16 RX ORDER — NAPROXEN 500 MG/1
500 TABLET ORAL 2 TIMES DAILY PRN
Qty: 28 TABLET | Refills: 1 | Status: SHIPPED | OUTPATIENT
Start: 2024-09-16

## 2024-09-16 NOTE — ASSESSMENT & PLAN NOTE
Reviewed risk 27% with dense breasts - recommend genetic testing and mammo starting now, alternating with breast MRI   Orders:    Ambulatory Referral to Genetics; Future    Mammo screening bilateral w 3d and cad; Future    MRI breast bilateral w and wo contrast w cad; Future

## 2024-09-16 NOTE — PROGRESS NOTES
Assessment/Plan:     Assessment & Plan  Well adult exam  Will get blood work today (had few sips of coffee so not truly fasting)   Orders:    Comprehensive metabolic panel; Future    Lipid panel; Future    Family history of breast cancer  Reviewed risk 27% with dense breasts - recommend genetic testing and mammo starting now, alternating with breast MRI   Orders:    Ambulatory Referral to Genetics; Future    Mammo screening bilateral w 3d and cad; Future    MRI breast bilateral w and wo contrast w cad; Future    Dense breasts  Reviewed risk 27% with dense breasts - recommend genetic testing and mammo starting now, alternating with breast MRI   Orders:    Ambulatory Referral to Genetics; Future    Mammo screening bilateral w 3d and cad; Future    MRI breast bilateral w and wo contrast w cad; Future    Chronic bilateral low back pain with bilateral sciatica  Uses naprosyn as needed back pain related to work. Refill today.   Orders:    naproxen (NAPROSYN) 500 mg tablet; Take 1 tablet (500 mg total) by mouth 2 (two) times a day as needed for mild pain    Family history of thyroid disease  Update labs  Orders:    TSH, 3rd generation; Future         Well adult exam  ·         Continue healthy diet   ·         Encourage exercise 4 times a week or more for minimum 30 minutes.   ·         Continue to see dentist, wear seatbelt.  ·         Health maintenance reviewed - blood work today. Start mammo/breast MRI.   Reviewed age appropriate health maintenance screenings and immunizations that are due, risks and benefits of these.   Health Maintenance   Topic Date Due    Annual Physical  04/25/2024    COVID-19 Vaccine (3 - 2023-24 season) 09/01/2024    Influenza Vaccine (1) 09/01/2024    Depression Screening  09/16/2025    Cervical Cancer Screening  08/02/2026    DTaP,Tdap,and Td Vaccines (2 - Td or Tdap) 05/31/2027    Zoster Vaccine (1 of 2) 09/22/2038    RSV Vaccine Age 60+ Years (1 - 1-dose 60+ series) 09/22/2048    HIV  "Screening  Completed    Hepatitis C Screening  Completed    HPV Vaccine  Completed    RSV Vaccine age 0-20 Months  Aged Out    Pneumococcal Vaccine: Pediatrics (0 to 5 Years) and At-Risk Patients (6 to 64 Years)  Aged Out    HIB Vaccine  Aged Out    IPV Vaccine  Aged Out    Hepatitis A Vaccine  Aged Out    Meningococcal ACWY Vaccine  Aged Out     Return in about 1 year (around 9/16/2025) for Annual physical.    Subjective:    SHEKHAR Jackman is a 33 y.o. female who presents today for a physical.     Chief Complaint   Patient presents with    Physical Exam     Physical - no concerns at this time.         Patient Care Team:  Leticia Garcia DO as PCP - General (Family Medicine)  Teresa Salvador DO (Obstetrics and Gynecology)    PHQ-2/9 Depression Screening    Little interest or pleasure in doing things: 0 - not at all  Feeling down, depressed, or hopeless: 0 - not at all  PHQ-2 Score: 0  PHQ-2 Interpretation: Negative depression screen            ---Above per clinical staff & reviewed. ---  Patient here today for a physical:    Diet: healthy   Exercise:   5 days a week   Dental visits:  yes   Sleep: 8 hours, good quality    Concerns today:  To see podiatrist   Back pain related to work usually - naprosyn helps             The following portions of the patient's history were reviewed and updated as appropriate: allergies, current medications, past family history, past medical history, past social history, past surgical history and problem list.     Current Medications:  Current Outpatient Medications   Medication Sig Dispense Refill    naproxen (NAPROSYN) 500 mg tablet Take 1 tablet (500 mg total) by mouth 2 (two) times a day as needed for mild pain 28 tablet 1     No current facility-administered medications for this visit.        Objective:      /76 (BP Location: Left arm, Patient Position: Sitting, Cuff Size: Standard)   Pulse 61   Temp 98.2 °F (36.8 °C) (Temporal)   Resp 16   Ht 5' 6.85\" (1.698 m)  "  Wt 73.2 kg (161 lb 6.4 oz)   SpO2 99%   BMI 25.39 kg/m²   BP Readings from Last 3 Encounters:   09/16/24 108/76   06/29/23 102/72   04/25/23 100/64     Wt Readings from Last 3 Encounters:   09/16/24 73.2 kg (161 lb 6.4 oz)   06/29/23 68.9 kg (152 lb)   04/25/23 71.2 kg (157 lb)       Review of Systems  ROS:  all others negative - no chest pain, SOB, normal urine and bowels. no GERD. sleeping well. mood good.     Physical Exam   Constitutional: she appears well-developed and well-nourished.   HENT: Head: Normocephalic.   Right Ear: External ear normal. Tympanic membrane normal.   Left Ear: External ear normal. Tympanic membrane normal.   Nose: Nose normal. No mucosal edema, No rhinorrhea.   Right sinus exhibits no maxillary sinus tenderness.   Left sinus exhibits no maxillary sinus tenderness.   Mouth/Throat: Oropharynx is clear and moist.   Eyes: Normal conjunctiva.  No erythema. No discharge.  Neck: No pain on exam. Neck supple.   Cardiovascular: Normal rate, regular rhythm and normal heart sounds.    Pulmonary/Chest: Effort normal and breath sounds normal. No wheezes. No rales. No rhonchi.   Abdominal: Soft. Bowel sounds are normal. There is no tenderness.   Musculoskeletal: she exhibits no edema.   Lymphadenopathy: she has no cervical adenopathy.   Neurological: she  is alert and oriented to person, place, and time.   Skin: Skin is warm and dry. No rashes.  Psychiatric: she  has a normal mood and affect. her behavior is normal. Thought content normal.   Vitals reviewed.

## 2024-09-24 ENCOUNTER — OFFICE VISIT (OUTPATIENT)
Dept: PODIATRY | Facility: CLINIC | Age: 34
End: 2024-09-24
Payer: COMMERCIAL

## 2024-09-24 DIAGNOSIS — M20.11 ACQUIRED HALLUX VALGUS OF RIGHT FOOT: Primary | ICD-10-CM

## 2024-09-24 DIAGNOSIS — M20.12 ACQUIRED HALLUX VALGUS OF LEFT FOOT: ICD-10-CM

## 2024-09-24 PROCEDURE — 99204 OFFICE O/P NEW MOD 45 MIN: CPT | Performed by: PODIATRIST

## 2024-09-24 NOTE — PROGRESS NOTES
Ambulatory Visit  Name: Augustina Shepard      : 1990      MRN: [id number]  Encounter Provider: Jevon Ontiveros DPM  Encounter Date: 2024   Encounter department: [facility] [place]    I personally reviewed x-rays of the right foot taken today.  They reveal a hallux valgus deformity with an intermetatarsal angle of 13 degrees.    I personally reviewed x-rays of the left foot taken today.  They reveal a hallux valgus deformity with an intermetatarsal angle of 15 degrees.    Discussed etiology and treatment options for hallux valgus.  Patient has a family history of the deformity.  Patient is interested in surgical intervention first addressing the right foot.  An Amadou bunionectomy is recommended for the right foot with an offset V bunionectomy recommended for the left foot.  She is hopeful to have the right foot surgery in 2024 with the left foot surgery in 2024.  By performing unilateral surgery, she hopes to return to work approximately 1 week after the surgery and can return in a sedentary position.    The procedure was explained including pre and postoperative course, risk and possible complications.  Patient will be rescheduled prior to her surgical date in February for consent signing.    Assessment & Plan  Acquired hallux valgus of right foot    Orders:    XR foot 3+ vw right; Future    Case request operating room: [facility]; Standing    Acquired hallux valgus of left foot    Orders:    XR foot 3+ vw left; Future      History of Present Illness     Augustina Shepard is a 34 y.o. female who presents as a 34-year-old female in excellent health presenting with painful bunions deformity affecting each foot.  Clinically, the left foot bunion appears larger but the right is more painful.  Patient states that the pain has been increasing in intensity and she has difficulty wearing enclosed shoes.    The patient is employed as a dental hygienist.    Past medical  history is unremarkable as is past surgical history.      Review of Systems   Gastrointestinal: Negative.    Musculoskeletal:  Positive for gait problem.   Psychiatric/Behavioral: Negative.                 Objective     There were no vitals taken for this visit.    Physical Exam  Constitutional:       Appearance: Normal appearance.   Cardiovascular:      Pulses: Normal pulses.   Musculoskeletal:         General: Deformity present.      Comments: Hallux valgus deformity bilateral.  Clinically, left medial eminence is more pronounced than the right however right is more painful.  Good range of motion first MPJ bilateral.  In addition to the inflamed medial eminence each great toe is deviated laterally.   Skin:     General: Skin is warm.   Neurological:      General: No focal deficit present.      Mental Status: She is oriented to person, place, and time.

## 2024-10-11 ENCOUNTER — TELEPHONE (OUTPATIENT)
Dept: PODIATRY | Facility: CLINIC | Age: 34
End: 2024-10-11

## 2024-11-12 ENCOUNTER — HOSPITAL ENCOUNTER (OUTPATIENT)
Dept: RADIOLOGY | Facility: HOSPITAL | Age: 34
Discharge: HOME | End: 2024-11-12
Payer: COMMERCIAL

## 2024-11-12 DIAGNOSIS — Z80.3 FAMILY HISTORY OF BREAST CANCER: ICD-10-CM

## 2024-11-12 DIAGNOSIS — R92.30 DENSE BREASTS: ICD-10-CM

## 2024-11-12 PROCEDURE — C8937 CAD BREAST MRI: HCPCS

## 2024-11-12 PROCEDURE — C8908 MRI W/O FOL W/CONT, BREAST,: HCPCS

## 2024-11-12 PROCEDURE — A9585 GADOBUTROL INJECTION: HCPCS | Performed by: FAMILY MEDICINE

## 2024-11-12 RX ORDER — GADOBUTROL 604.72 MG/ML
7 INJECTION INTRAVENOUS
Status: COMPLETED | OUTPATIENT
Start: 2024-11-12 | End: 2024-11-12

## 2024-11-12 RX ADMIN — GADOBUTROL 7 ML: 604.72 INJECTION INTRAVENOUS at 14:31

## 2024-11-25 ENCOUNTER — DOCUMENTATION (OUTPATIENT)
Dept: GENETICS | Facility: CLINIC | Age: 34
End: 2024-11-25

## 2024-11-26 ENCOUNTER — HOSPITAL ENCOUNTER (OUTPATIENT)
Dept: MAMMOGRAPHY | Facility: CLINIC | Age: 34
Discharge: HOME | End: 2024-11-26

## 2024-11-26 DIAGNOSIS — R92.8 ABNORMAL MAMMOGRAM: ICD-10-CM

## 2024-11-27 ENCOUNTER — TELEPHONE (OUTPATIENT)
Dept: PODIATRY | Facility: CLINIC | Age: 34
End: 2024-11-27

## 2024-12-18 DIAGNOSIS — Z01.818 PRE-OP EVALUATION: Primary | ICD-10-CM

## 2025-01-03 ENCOUNTER — TELEPHONE (OUTPATIENT)
Age: 35
End: 2025-01-03

## 2025-01-03 NOTE — TELEPHONE ENCOUNTER
Patient scheduled by my chart for her yearly and it has been over 3 years since she was seen. Can you please change to new patient at your convenience? Thank you

## 2025-01-16 ENCOUNTER — TELEPHONE (OUTPATIENT)
Age: 35
End: 2025-01-16

## 2025-01-16 ENCOUNTER — PREP FOR PROCEDURE (OUTPATIENT)
Dept: PODIATRY | Facility: CLINIC | Age: 35
End: 2025-01-16

## 2025-01-16 NOTE — TELEPHONE ENCOUNTER
Caller: Patient    Doctor: Rama    Reason for call: is scheduled for surgery on Right foot 1st, and she would like to know if you can start with the Left foot 1st instead     Call back#: [phone]

## 2025-01-17 DIAGNOSIS — Z00.6 ENCOUNTER FOR EXAMINATION FOR NORMAL COMPARISON OR CONTROL IN CLINICAL RESEARCH PROGRAM: ICD-10-CM

## 2025-01-24 ENCOUNTER — TELEPHONE (OUTPATIENT)
Dept: FAMILY MEDICINE CLINIC | Facility: CLINIC | Age: 35
End: 2025-01-24

## 2025-01-24 ENCOUNTER — APPOINTMENT (OUTPATIENT)
Dept: LAB | Facility: CLINIC | Age: 35
End: 2025-01-24
Payer: COMMERCIAL

## 2025-01-24 ENCOUNTER — TELEPHONE (OUTPATIENT)
Age: 35
End: 2025-01-24

## 2025-01-24 DIAGNOSIS — Z00.6 ENCOUNTER FOR EXAMINATION FOR NORMAL COMPARISON OR CONTROL IN CLINICAL RESEARCH PROGRAM: ICD-10-CM

## 2025-01-24 DIAGNOSIS — Z01.818 PRE-OP EVALUATION: ICD-10-CM

## 2025-01-24 LAB
ANION GAP SERPL CALCULATED.3IONS-SCNC: 6 MMOL/L (ref 4–13)
BASOPHILS # BLD AUTO: 0.05 THOUSANDS/ΜL (ref 0–0.1)
BASOPHILS NFR BLD AUTO: 1 % (ref 0–1)
BUN SERPL-MCNC: 15 MG/DL (ref 5–25)
CALCIUM SERPL-MCNC: 9.2 MG/DL (ref 8.4–10.2)
CHLORIDE SERPL-SCNC: 105 MMOL/L (ref 96–108)
CO2 SERPL-SCNC: 28 MMOL/L (ref 21–32)
CREAT SERPL-MCNC: 0.87 MG/DL (ref 0.6–1.3)
EOSINOPHIL # BLD AUTO: 0.08 THOUSAND/ΜL (ref 0–0.61)
EOSINOPHIL NFR BLD AUTO: 1 % (ref 0–6)
ERYTHROCYTE [DISTWIDTH] IN BLOOD BY AUTOMATED COUNT: 12.3 % (ref 11.6–15.1)
GFR SERPL CREATININE-BSD FRML MDRD: 85 ML/MIN/1.73SQ M
GLUCOSE SERPL-MCNC: 83 MG/DL (ref 65–140)
HCT VFR BLD AUTO: 38.3 % (ref 34.8–46.1)
HGB BLD-MCNC: 12.7 G/DL (ref 11.5–15.4)
IMM GRANULOCYTES # BLD AUTO: 0.03 THOUSAND/UL (ref 0–0.2)
IMM GRANULOCYTES NFR BLD AUTO: 0 % (ref 0–2)
LYMPHOCYTES # BLD AUTO: 2.65 THOUSANDS/ΜL (ref 0.6–4.47)
LYMPHOCYTES NFR BLD AUTO: 31 % (ref 14–44)
MCH RBC QN AUTO: 30.7 PG (ref 26.8–34.3)
MCHC RBC AUTO-ENTMCNC: 33.2 G/DL (ref 31.4–37.4)
MCV RBC AUTO: 93 FL (ref 82–98)
MONOCYTES # BLD AUTO: 0.51 THOUSAND/ΜL (ref 0.17–1.22)
MONOCYTES NFR BLD AUTO: 6 % (ref 4–12)
NEUTROPHILS # BLD AUTO: 5.36 THOUSANDS/ΜL (ref 1.85–7.62)
NEUTS SEG NFR BLD AUTO: 61 % (ref 43–75)
NRBC BLD AUTO-RTO: 0 /100 WBCS
PLATELET # BLD AUTO: 283 THOUSANDS/UL (ref 149–390)
PMV BLD AUTO: 8.7 FL (ref 8.9–12.7)
POTASSIUM SERPL-SCNC: 3.5 MMOL/L (ref 3.5–5.3)
RBC # BLD AUTO: 4.14 MILLION/UL (ref 3.81–5.12)
SODIUM SERPL-SCNC: 139 MMOL/L (ref 135–147)
WBC # BLD AUTO: 8.68 THOUSAND/UL (ref 4.31–10.16)

## 2025-01-24 PROCEDURE — 36415 COLL VENOUS BLD VENIPUNCTURE: CPT

## 2025-01-24 PROCEDURE — 85025 COMPLETE CBC W/AUTO DIFF WBC: CPT

## 2025-01-24 PROCEDURE — 80048 BASIC METABOLIC PNL TOTAL CA: CPT

## 2025-01-24 NOTE — TELEPHONE ENCOUNTER
Name of procedure: bunionectomy naye(left foot)     Diagnosis:Acquired hallux valgus of left foot [M20.12]      Date of procedure (within 30 days): 2/7/24     Surgeon: Jevon Ontiveros DPM      Location of procedure (hospital or out patient facility name): [facility] date/location/ type (Which labs? EKG? CXR?): EKG and labs     Records (on epic or requested): epic     Type of anaesthesia:  General/LMA      Paperwork to complete for Pre-op (patient bringing vs. calling office to obtain prior to appointment): office faxing over     Pre-op appointment scheduled (date/time): 1/31/25 at 11:20am

## 2025-01-24 NOTE — TELEPHONE ENCOUNTER
Caller: Dr. Garcia's Office     Doctor: Rama /Rajani    Reason for call:They need pre-op paperwork faxed over.  Thank you.    Call back#: Fax: [phone]

## 2025-01-27 ENCOUNTER — TELEPHONE (OUTPATIENT)
Age: 35
End: 2025-01-27

## 2025-01-27 NOTE — TELEPHONE ENCOUNTER
Caller: Augustina Shepard    Doctor: Rama Gerard    Reason for call: Augustina has some questions regarding her surgery.  Can someone give her a call?  Thank you.     Call back#: [phone]

## 2025-01-28 NOTE — TELEPHONE ENCOUNTER
Called pt back left a message with my number. I did adviser her to call me back if she had any question that still needed to be answered.

## 2025-01-30 NOTE — H&P (VIEW-ONLY)
Pre-operative Clearance  Name: Augustina Shepard      : 1990      MRN: [id number]  Encounter Provider: Jannie Sheffield DO  Encounter Date: 2025   Encounter department: [facility]    Assessment & Plan  Pre-operative examination  Pending surgery per Podiatry.         Acquired hallux valgus of left foot  Pending surgery per Podiatry.         Abnormal EKG      Abnormal EKG - Check Echo to rule out possible left atrial enlargement.  This does not need to be completed prior to surgery.      Sinus bradycardia with marked sinus arrhythmia  Possible Left atrial enlargement  No previous ECGs available  Confirmed by Stephani Carbone () on 2025 7:57:28 PM    Orders:  •  Echo complete w/ contrast if indicated; Future    Overweight    Stable.  Recommend lifestyle modifications.               Pre-operative Clearance:     Revised Cardiac Risk Index:  RCI RISK CLASS I (0 risk factors, risk of major cardiac complications approximately 0.5%)    Clearance:  Patient is medically optimized (CLEARED) for proposed surgery without any additional cardiac testing.       Stable CBC and BMP 25.    Abnormal EKG - Check Echo to rule out possible left atrial enlargement.  This does not need to be completed prior to surgery.      Sinus bradycardia with marked sinus arrhythmia  Possible Left atrial enlargement  No previous ECGs available  Confirmed by Stephani Carbone () on 2025 7:57:28 PM        Medication Instructions:   - Avoid herbs or non-directed vitamins one week prior to surgery    - Avoid aspirin containing medications or non-steroidal anti-inflammatory drugs one week preceding surgery    - May take tylenol for pain up until the night before surgery         History of Present Illness     Pre-op Exam    Case: [id number] Date/Time: 25  Procedure: BUNIONECTOMY THOMAS (Left: Foot)  Anesthesia type: General/LMA  Diagnosis: Acquired hallux valgus of left foot [M20.12]  Pre-op  diagnosis: Acquired hallux valgus of left foot [M20.12]  Location:  OR ROOM 12 / [facility]  Surgeons: XOCHITL Rivas  High Risk surgery?         1 Point  CAD History:         1 Point   MI; Positive Stress Test; CP due to Mi;  Nitrate Usage to control Angina; Pathologic Q wave on EKG  CHF Active:         1 Point   Pulm Edema; Paroxysmal Nocturnal Dyspnea;  Bibasilar Rales (crackles);S3; CHF on CXR  Cerebrovascular Disease (TIA or CVA):     1 Point  DM on Insulin:        1 Point  Serum Creat >2.0 mg/dl:       1 Point          Total Points: 0     Scorin: Class I, Very Low Risk (0.4%)     1: Class II, Low risk (0.9%)     2: Class III Moderate (6.6%)     3: Class IV High (>11%)      Previous history of bleeding disorders or clots?: No  Previous Anesthesia reaction?: No  Prolonged steroid use in the last 6 months?: No    Assessment of Cardiac Risk:   - Unstable or severe angina or MI in the last 6 weeks or history of stent placement in the last year?: No   - Decompensated heart failure (e.g. New onset heart failure, NYHA  Class IV heart failure, or worsening existing heart failure)?: No  - Significant arrhythmias such as high grade AV block, symptomatic ventricular arrhythmia, newly recognized ventricular tachycardia, supraventricular tachycardia with resting heart rate >100, or symptomatic bradycardia?: No  - Severe heart valve disease including aortic stenosis or symptomatic mitral stenosis?: No      Pre-operative Risk Factors:  Elevated-risk surgery: No    History of cerebrovascular disease: No    History of ischemic heart disease: No  Pre-operative treatment with insulin: No  Pre-operative creatinine >2 mg/dL: No    History of congestive heart failure: No    Medications of Perioperative Concern:   NSAIDs    Review of Systems   Constitutional:  Negative for appetite change, chills, diaphoresis, fatigue and fever.   Respiratory:  Negative for chest tightness and  "shortness of breath.    Cardiovascular:  Negative for chest pain.   Gastrointestinal:  Negative for abdominal pain, blood in stool, diarrhea, nausea and vomiting.   Genitourinary:  Negative for dysuria.     Past Medical History   Past Medical History:   Diagnosis Date   • ADD (attention deficit disorder)    • Breast lump    • H/O long-term treatment with high-risk medication    • Lump of left breast 1/27/2021     Past Surgical History:   Procedure Laterality Date   • NO PAST SURGERIES       Family History   Problem Relation Age of Onset   • Varicose Veins Mother    • Thyroid disease Father    • ADD / ADHD Father    • No Known Problems Maternal Grandmother    • Prostate cancer Maternal Grandfather    • Colon cancer Maternal Grandfather 70   • Breast cancer Paternal Grandmother 70   • Breast cancer Paternal Aunt 35   • No Known Problems Paternal Aunt    • Breast cancer Cousin 33        Paternal Cousin - no genetic testing known   • Breast cancer Cousin 33   • No Known Problems Half-Sister      Social History     Tobacco Use   • Smoking status: Never     Passive exposure: Never   • Smokeless tobacco: Never   Vaping Use   • Vaping status: Never Used   Substance and Sexual Activity   • Alcohol use: Yes     Alcohol/week: 2.0 standard drinks of alcohol     Types: 2 Glasses of wine per week     Comment: socially   • Drug use: No   • Sexual activity: Yes     Partners: Male     Birth control/protection: None     Current Outpatient Medications on File Prior to Visit   Medication Sig   • naproxen (NAPROSYN) 500 mg tablet Take 1 tablet (500 mg total) by mouth 2 (two) times a day as needed for mild pain     Allergies   Allergen Reactions   • Penicillins      Reaction Date: 02Sep2008;    • Sulfa Antibiotics      Reaction Date: 02Sep2008;      Objective   /60   Pulse 60   Temp 97.8 °F (36.6 °C) (Temporal)   Resp 16   Ht 5' 6.85\" (1.698 m)   Wt 72.1 kg (159 lb)   SpO2 100%   BMI 25.01 kg/m²     Physical Exam  Vitals and " nursing note reviewed.   Constitutional:       General: She is not in acute distress.     Appearance: Normal appearance. She is well-developed. She is not ill-appearing or toxic-appearing.   HENT:      Head: Normocephalic and atraumatic.      Right Ear: Tympanic membrane, ear canal and external ear normal.      Left Ear: Tympanic membrane, ear canal and external ear normal.      Nose: Nose normal.      Mouth/Throat:      Mouth: Mucous membranes are moist.      Pharynx: Oropharynx is clear.   Eyes:      Extraocular Movements: Extraocular movements intact.      Conjunctiva/sclera: Conjunctivae normal.      Pupils: Pupils are equal, round, and reactive to light.   Cardiovascular:      Rate and Rhythm: Normal rate and regular rhythm.      Pulses: Normal pulses.      Heart sounds: Normal heart sounds. No murmur heard.  Pulmonary:      Effort: Pulmonary effort is normal. No respiratory distress.      Breath sounds: Normal breath sounds.   Abdominal:      General: Abdomen is flat. Bowel sounds are normal. There is no distension.      Palpations: Abdomen is soft. There is no mass.      Tenderness: There is no abdominal tenderness. There is no guarding or rebound.   Musculoskeletal:         General: No swelling or tenderness.      Cervical back: Neck supple.      Right lower leg: No edema.      Left lower leg: No edema.   Lymphadenopathy:      Cervical: No cervical adenopathy.   Skin:     General: Skin is warm and dry.   Neurological:      General: No focal deficit present.      Mental Status: She is alert and oriented to person, place, and time.   Psychiatric:         Mood and Affect: Mood normal.           Jannie Sheffield,

## 2025-01-30 NOTE — PATIENT INSTRUCTIONS
You may use Tylenol (Acetaminophen) up to 3,000mg daily (in 24 hours) as needed for pain or fever.      Pre-operative Medication Instructions    Avoid herbs or non-directed vitamins one week prior to surgery  Avoid aspirin containing medications or non-steroidal anti-inflammatory drugs one week preceding surgery  May take tylenol for pain up until the night before surgery

## 2025-01-30 NOTE — PROGRESS NOTES
Pre-operative Clearance  Name: Augustina Shepard      : 1990      MRN: [id number]  Encounter Provider: Jannie Sheffield DO  Encounter Date: 2025   Encounter department: [facility]    Assessment & Plan  Pre-operative examination  Pending surgery per Podiatry.         Acquired hallux valgus of left foot  Pending surgery per Podiatry.         Abnormal EKG      Abnormal EKG - Check Echo to rule out possible left atrial enlargement.  This does not need to be completed prior to surgery.      Sinus bradycardia with marked sinus arrhythmia  Possible Left atrial enlargement  No previous ECGs available  Confirmed by Stephani Carbone () on 2025 7:57:28 PM    Orders:  •  Echo complete w/ contrast if indicated; Future    Overweight    Stable.  Recommend lifestyle modifications.               Pre-operative Clearance:     Revised Cardiac Risk Index:  RCI RISK CLASS I (0 risk factors, risk of major cardiac complications approximately 0.5%)    Clearance:  Patient is medically optimized (CLEARED) for proposed surgery without any additional cardiac testing.       Stable CBC and BMP 25.    Abnormal EKG - Check Echo to rule out possible left atrial enlargement.  This does not need to be completed prior to surgery.      Sinus bradycardia with marked sinus arrhythmia  Possible Left atrial enlargement  No previous ECGs available  Confirmed by Stephani Carbone () on 2025 7:57:28 PM        Medication Instructions:   - Avoid herbs or non-directed vitamins one week prior to surgery    - Avoid aspirin containing medications or non-steroidal anti-inflammatory drugs one week preceding surgery    - May take tylenol for pain up until the night before surgery         History of Present Illness     Pre-op Exam    Case: [id number] Date/Time: 25  Procedure: BUNIONECTOMY THOMAS (Left: Foot)  Anesthesia type: General/LMA  Diagnosis: Acquired hallux valgus of left foot [M20.12]  Pre-op  diagnosis: Acquired hallux valgus of left foot [M20.12]  Location:  OR ROOM 12 / [facility]  Surgeons: XOCHITL Rivas  High Risk surgery?         1 Point  CAD History:         1 Point   MI; Positive Stress Test; CP due to Mi;  Nitrate Usage to control Angina; Pathologic Q wave on EKG  CHF Active:         1 Point   Pulm Edema; Paroxysmal Nocturnal Dyspnea;  Bibasilar Rales (crackles);S3; CHF on CXR  Cerebrovascular Disease (TIA or CVA):     1 Point  DM on Insulin:        1 Point  Serum Creat >2.0 mg/dl:       1 Point          Total Points: 0     Scorin: Class I, Very Low Risk (0.4%)     1: Class II, Low risk (0.9%)     2: Class III Moderate (6.6%)     3: Class IV High (>11%)      Previous history of bleeding disorders or clots?: No  Previous Anesthesia reaction?: No  Prolonged steroid use in the last 6 months?: No    Assessment of Cardiac Risk:   - Unstable or severe angina or MI in the last 6 weeks or history of stent placement in the last year?: No   - Decompensated heart failure (e.g. New onset heart failure, NYHA  Class IV heart failure, or worsening existing heart failure)?: No  - Significant arrhythmias such as high grade AV block, symptomatic ventricular arrhythmia, newly recognized ventricular tachycardia, supraventricular tachycardia with resting heart rate >100, or symptomatic bradycardia?: No  - Severe heart valve disease including aortic stenosis or symptomatic mitral stenosis?: No      Pre-operative Risk Factors:  Elevated-risk surgery: No    History of cerebrovascular disease: No    History of ischemic heart disease: No  Pre-operative treatment with insulin: No  Pre-operative creatinine >2 mg/dL: No    History of congestive heart failure: No    Medications of Perioperative Concern:   NSAIDs    Review of Systems   Constitutional:  Negative for appetite change, chills, diaphoresis, fatigue and fever.   Respiratory:  Negative for chest tightness and  "shortness of breath.    Cardiovascular:  Negative for chest pain.   Gastrointestinal:  Negative for abdominal pain, blood in stool, diarrhea, nausea and vomiting.   Genitourinary:  Negative for dysuria.     Past Medical History   Past Medical History:   Diagnosis Date   • ADD (attention deficit disorder)    • Breast lump    • H/O long-term treatment with high-risk medication    • Lump of left breast 1/27/2021     Past Surgical History:   Procedure Laterality Date   • NO PAST SURGERIES       Family History   Problem Relation Age of Onset   • Varicose Veins Mother    • Thyroid disease Father    • ADD / ADHD Father    • No Known Problems Maternal Grandmother    • Prostate cancer Maternal Grandfather    • Colon cancer Maternal Grandfather 70   • Breast cancer Paternal Grandmother 70   • Breast cancer Paternal Aunt 35   • No Known Problems Paternal Aunt    • Breast cancer Cousin 33        Paternal Cousin - no genetic testing known   • Breast cancer Cousin 33   • No Known Problems Half-Sister      Social History     Tobacco Use   • Smoking status: Never     Passive exposure: Never   • Smokeless tobacco: Never   Vaping Use   • Vaping status: Never Used   Substance and Sexual Activity   • Alcohol use: Yes     Alcohol/week: 2.0 standard drinks of alcohol     Types: 2 Glasses of wine per week     Comment: socially   • Drug use: No   • Sexual activity: Yes     Partners: Male     Birth control/protection: None     Current Outpatient Medications on File Prior to Visit   Medication Sig   • naproxen (NAPROSYN) 500 mg tablet Take 1 tablet (500 mg total) by mouth 2 (two) times a day as needed for mild pain     Allergies   Allergen Reactions   • Penicillins      Reaction Date: 02Sep2008;    • Sulfa Antibiotics      Reaction Date: 02Sep2008;      Objective   /60   Pulse 60   Temp 97.8 °F (36.6 °C) (Temporal)   Resp 16   Ht 5' 6.85\" (1.698 m)   Wt 72.1 kg (159 lb)   SpO2 100%   BMI 25.01 kg/m²     Physical Exam  Vitals and " nursing note reviewed.   Constitutional:       General: She is not in acute distress.     Appearance: Normal appearance. She is well-developed. She is not ill-appearing or toxic-appearing.   HENT:      Head: Normocephalic and atraumatic.      Right Ear: Tympanic membrane, ear canal and external ear normal.      Left Ear: Tympanic membrane, ear canal and external ear normal.      Nose: Nose normal.      Mouth/Throat:      Mouth: Mucous membranes are moist.      Pharynx: Oropharynx is clear.   Eyes:      Extraocular Movements: Extraocular movements intact.      Conjunctiva/sclera: Conjunctivae normal.      Pupils: Pupils are equal, round, and reactive to light.   Cardiovascular:      Rate and Rhythm: Normal rate and regular rhythm.      Pulses: Normal pulses.      Heart sounds: Normal heart sounds. No murmur heard.  Pulmonary:      Effort: Pulmonary effort is normal. No respiratory distress.      Breath sounds: Normal breath sounds.   Abdominal:      General: Abdomen is flat. Bowel sounds are normal. There is no distension.      Palpations: Abdomen is soft. There is no mass.      Tenderness: There is no abdominal tenderness. There is no guarding or rebound.   Musculoskeletal:         General: No swelling or tenderness.      Cervical back: Neck supple.      Right lower leg: No edema.      Left lower leg: No edema.   Lymphadenopathy:      Cervical: No cervical adenopathy.   Skin:     General: Skin is warm and dry.   Neurological:      General: No focal deficit present.      Mental Status: She is alert and oriented to person, place, and time.   Psychiatric:         Mood and Affect: Mood normal.           Jannie Sheffield,

## 2025-01-31 ENCOUNTER — OFFICE VISIT (OUTPATIENT)
Dept: FAMILY MEDICINE CLINIC | Facility: CLINIC | Age: 35
End: 2025-01-31
Payer: COMMERCIAL

## 2025-01-31 VITALS
TEMPERATURE: 97.8 F | DIASTOLIC BLOOD PRESSURE: 60 MMHG | SYSTOLIC BLOOD PRESSURE: 102 MMHG | HEIGHT: 67 IN | BODY MASS INDEX: 24.96 KG/M2 | WEIGHT: 159 LBS | OXYGEN SATURATION: 100 % | RESPIRATION RATE: 16 BRPM | HEART RATE: 60 BPM

## 2025-01-31 DIAGNOSIS — Z01.818 PRE-OPERATIVE EXAMINATION: Primary | ICD-10-CM

## 2025-01-31 DIAGNOSIS — R94.31 ABNORMAL EKG: ICD-10-CM

## 2025-01-31 DIAGNOSIS — M20.12 ACQUIRED HALLUX VALGUS OF LEFT FOOT: ICD-10-CM

## 2025-01-31 DIAGNOSIS — E66.3 OVERWEIGHT: ICD-10-CM

## 2025-01-31 PROCEDURE — 99243 OFF/OP CNSLTJ NEW/EST LOW 30: CPT | Performed by: FAMILY MEDICINE

## 2025-01-31 NOTE — PRE-PROCEDURE INSTRUCTIONS
Pre-Surgery Instructions:   Medication Instructions    naproxen (NAPROSYN) 500 mg tablet Stop taking 7 days prior to surgery.     Medication instructions for day surgery reviewed. Please use only a sip of water to take your instructed medications. Avoid all over the counter vitamins, supplements and NSAIDS for one week prior to surgery per anesthesia guidelines. Tylenol is ok to take as needed.     You will receive a call one business day prior to surgery with an arrival time and hospital directions. If your surgery is scheduled on a Monday, the hospital will be calling you on the Friday prior to your surgery. If you have not heard from anyone by 8pm, please call the hospital supervisor through the hospital  at [phone]. ([place] [phone] or [place] [phone]).    Do not eat or drink anything after midnight the night before your surgery, including candy, mints, lifesavers, or chewing gum. Do not drink alcohol 24hrs before your surgery. Try not to smoke at least 24hrs before your surgery.       Follow the pre surgery showering instructions as listed in the “My Surgical Experience Booklet” or otherwise provided by your surgeon's office. Do not use a blade to shave the surgical area 1 week before surgery. It is okay to use a clean electric clippers up to 24 hours before surgery. Do not apply any lotions, creams, including makeup, cologne, deodorant, or perfumes after showering on the day of your surgery. Do not use dry shampoo, hair spray, hair gel, or any type of hair products.     No contact lenses, eye make-up, or artificial eyelashes. Remove nail polish, including gel polish, and any artificial, gel, or acrylic nails if possible. Remove all jewelry including rings and body piercing jewelry.     Wear causal clothing that is easy to take on and off. Consider your type of surgery.    Keep any valuables, jewelry, piercings at home. Please bring any specially ordered equipment (sling, braces) if  indicated.    Arrange for a responsible person to drive you to and from the hospital on the day of your surgery. Please confirm the visitor policy for the day of your procedure when you receive your phone call with an arrival time.     Call the surgeon's office with any new illnesses, exposures, or additional questions prior to surgery.    Please reference your “My Surgical Experience Booklet” for additional information to prepare for your upcoming surgery.

## 2025-02-04 LAB
APOB+LDLR+PCSK9 GENE MUT ANL BLD/T: NOT DETECTED
BRCA1+BRCA2 DEL+DUP + FULL MUT ANL BLD/T: NOT DETECTED
MLH1+MSH2+MSH6+PMS2 GN DEL+DUP+FUL M: NOT DETECTED

## 2025-02-07 ENCOUNTER — HOSPITAL ENCOUNTER (OUTPATIENT)
Facility: HOSPITAL | Age: 35
Setting detail: OUTPATIENT SURGERY
Discharge: HOME | End: 2025-02-07
Attending: PODIATRIST | Admitting: PODIATRIST
Payer: COMMERCIAL

## 2025-02-07 ENCOUNTER — APPOINTMENT (OUTPATIENT)
Dept: RADIOLOGY | Facility: HOSPITAL | Age: 35
End: 2025-02-07
Payer: COMMERCIAL

## 2025-02-07 ENCOUNTER — ANESTHESIA EVENT (OUTPATIENT)
Dept: PERIOP | Facility: HOSPITAL | Age: 35
End: 2025-02-07
Payer: COMMERCIAL

## 2025-02-07 ENCOUNTER — ANESTHESIA (OUTPATIENT)
Dept: PERIOP | Facility: HOSPITAL | Age: 35
End: 2025-02-07
Payer: COMMERCIAL

## 2025-02-07 VITALS
BODY MASS INDEX: 25.55 KG/M2 | SYSTOLIC BLOOD PRESSURE: 118 MMHG | OXYGEN SATURATION: 99 % | DIASTOLIC BLOOD PRESSURE: 74 MMHG | RESPIRATION RATE: 18 BRPM | HEIGHT: 66 IN | TEMPERATURE: 97.3 F | HEART RATE: 58 BPM | WEIGHT: 159 LBS

## 2025-02-07 DIAGNOSIS — Z98.890 POST-OPERATIVE STATE: Primary | ICD-10-CM

## 2025-02-07 LAB
EXT PREGNANCY TEST URINE: NEGATIVE
EXT. CONTROL: NORMAL

## 2025-02-07 PROCEDURE — 28296 COR HLX VLGS DSTL MTAR OSTEO: CPT | Performed by: PODIATRIST

## 2025-02-07 PROCEDURE — 81025 URINE PREGNANCY TEST: CPT | Performed by: PODIATRIST

## 2025-02-07 PROCEDURE — C1713 ANCHOR/SCREW BN/BN,TIS/BN: HCPCS | Performed by: PODIATRIST

## 2025-02-07 PROCEDURE — 99024 POSTOP FOLLOW-UP VISIT: CPT | Performed by: PODIATRIST

## 2025-02-07 PROCEDURE — 73630 X-RAY EXAM OF FOOT: CPT

## 2025-02-07 DEVICE — K-WIRE TROCAR POINT BOTH ENDS .045                                    X 4: Type: IMPLANTABLE DEVICE | Site: FOOT | Status: FUNCTIONAL

## 2025-02-07 DEVICE — WIRE 0.86MM TROCAR TIP: Type: IMPLANTABLE DEVICE | Site: FOOT | Status: FUNCTIONAL

## 2025-02-07 DEVICE — SCREW COMP 2.5 X 14MM MICRO FT: Type: IMPLANTABLE DEVICE | Site: FOOT | Status: FUNCTIONAL

## 2025-02-07 RX ORDER — BUPIVACAINE HYDROCHLORIDE 5 MG/ML
INJECTION, SOLUTION EPIDURAL; INTRACAUDAL AS NEEDED
Status: DISCONTINUED | OUTPATIENT
Start: 2025-02-07 | End: 2025-02-07 | Stop reason: HOSPADM

## 2025-02-07 RX ORDER — OXYCODONE AND ACETAMINOPHEN 5; 325 MG/1; MG/1
1 TABLET ORAL EVERY 6 HOURS PRN
Qty: 15 TABLET | Refills: 0 | Status: SHIPPED | OUTPATIENT
Start: 2025-02-07 | End: 2025-02-17

## 2025-02-07 RX ORDER — DEXAMETHASONE SODIUM PHOSPHATE 10 MG/ML
INJECTION, SOLUTION INTRAMUSCULAR; INTRAVENOUS AS NEEDED
Status: DISCONTINUED | OUTPATIENT
Start: 2025-02-07 | End: 2025-02-07

## 2025-02-07 RX ORDER — ONDANSETRON 2 MG/ML
4 INJECTION INTRAMUSCULAR; INTRAVENOUS ONCE AS NEEDED
Status: DISCONTINUED | OUTPATIENT
Start: 2025-02-07 | End: 2025-02-07 | Stop reason: HOSPADM

## 2025-02-07 RX ORDER — FENTANYL CITRATE/PF 50 MCG/ML
50 SYRINGE (ML) INJECTION
Status: DISCONTINUED | OUTPATIENT
Start: 2025-02-07 | End: 2025-02-07 | Stop reason: HOSPADM

## 2025-02-07 RX ORDER — MIDAZOLAM HYDROCHLORIDE 2 MG/2ML
INJECTION, SOLUTION INTRAMUSCULAR; INTRAVENOUS AS NEEDED
Status: DISCONTINUED | OUTPATIENT
Start: 2025-02-07 | End: 2025-02-07

## 2025-02-07 RX ORDER — ONDANSETRON 2 MG/ML
INJECTION INTRAMUSCULAR; INTRAVENOUS AS NEEDED
Status: DISCONTINUED | OUTPATIENT
Start: 2025-02-07 | End: 2025-02-07

## 2025-02-07 RX ORDER — LIDOCAINE HYDROCHLORIDE 10 MG/ML
INJECTION, SOLUTION EPIDURAL; INFILTRATION; INTRACAUDAL; PERINEURAL AS NEEDED
Status: DISCONTINUED | OUTPATIENT
Start: 2025-02-07 | End: 2025-02-07

## 2025-02-07 RX ORDER — PROPOFOL 10 MG/ML
INJECTION, EMULSION INTRAVENOUS AS NEEDED
Status: DISCONTINUED | OUTPATIENT
Start: 2025-02-07 | End: 2025-02-07

## 2025-02-07 RX ORDER — OXYCODONE AND ACETAMINOPHEN 5; 325 MG/1; MG/1
1 TABLET ORAL ONCE AS NEEDED
Refills: 0 | Status: DISCONTINUED | OUTPATIENT
Start: 2025-02-07 | End: 2025-02-07 | Stop reason: HOSPADM

## 2025-02-07 RX ORDER — SODIUM CHLORIDE, SODIUM LACTATE, POTASSIUM CHLORIDE, CALCIUM CHLORIDE 600; 310; 30; 20 MG/100ML; MG/100ML; MG/100ML; MG/100ML
INJECTION, SOLUTION INTRAVENOUS CONTINUOUS PRN
Status: DISCONTINUED | OUTPATIENT
Start: 2025-02-07 | End: 2025-02-07

## 2025-02-07 RX ORDER — MAGNESIUM HYDROXIDE 1200 MG/15ML
LIQUID ORAL AS NEEDED
Status: DISCONTINUED | OUTPATIENT
Start: 2025-02-07 | End: 2025-02-07 | Stop reason: HOSPADM

## 2025-02-07 RX ORDER — CEFAZOLIN SODIUM 1 G/50ML
1000 SOLUTION INTRAVENOUS ONCE
Status: COMPLETED | OUTPATIENT
Start: 2025-02-07 | End: 2025-02-07

## 2025-02-07 RX ORDER — FENTANYL CITRATE 50 UG/ML
INJECTION, SOLUTION INTRAMUSCULAR; INTRAVENOUS AS NEEDED
Status: DISCONTINUED | OUTPATIENT
Start: 2025-02-07 | End: 2025-02-07

## 2025-02-07 RX ADMIN — FENTANYL CITRATE 25 MCG: 50 INJECTION, SOLUTION INTRAMUSCULAR; INTRAVENOUS at 07:49

## 2025-02-07 RX ADMIN — PROPOFOL 150 MG: 10 INJECTION, EMULSION INTRAVENOUS at 07:38

## 2025-02-07 RX ADMIN — LIDOCAINE HYDROCHLORIDE 50 MG: 10 SOLUTION INTRAVENOUS at 07:38

## 2025-02-07 RX ADMIN — ONDANSETRON 4 MG: 2 INJECTION INTRAMUSCULAR; INTRAVENOUS at 07:31

## 2025-02-07 RX ADMIN — MIDAZOLAM 2 MG: 1 INJECTION INTRAMUSCULAR; INTRAVENOUS at 07:31

## 2025-02-07 RX ADMIN — SODIUM CHLORIDE, SODIUM LACTATE, POTASSIUM CHLORIDE, AND CALCIUM CHLORIDE: .6; .31; .03; .02 INJECTION, SOLUTION INTRAVENOUS at 07:19

## 2025-02-07 RX ADMIN — FENTANYL CITRATE 50 MCG: 50 INJECTION, SOLUTION INTRAMUSCULAR; INTRAVENOUS at 07:42

## 2025-02-07 RX ADMIN — CEFAZOLIN SODIUM 2000 MG: 1 SOLUTION INTRAVENOUS at 07:24

## 2025-02-07 RX ADMIN — FENTANYL CITRATE 25 MCG: 50 INJECTION, SOLUTION INTRAMUSCULAR; INTRAVENOUS at 08:37

## 2025-02-07 RX ADMIN — DEXAMETHASONE SODIUM PHOSPHATE 10 MG: 10 INJECTION INTRAMUSCULAR; INTRAVENOUS at 07:42

## 2025-02-07 NOTE — DISCHARGE SUMMARY
Discharge Summary Outpatient Procedure Podiatry -   Augustina Shepard 34 y.o. female MRN: [id number]  Unit/Bed#: OR POOL Encounter: [id number]    Admission Date: 2/7/2025     Admitting Diagnosis: Acquired hallux valgus of left foot [M20.12]    Discharge Diagnosis: same    Procedures Performed: BUNIONECTOMY THOMAS: 69193 (CPT®) (left)    Complications: none    Condition at Discharge: stable    Discharge instructions/Information to patient and family:   See after visit summary for information provided to patient and family.      Provisions for Follow-Up Care/Important appointments:  See after visit summary for information related to follow-up care and any pertinent home health orders.      Discharge Medications:  See after visit summary for reconciled discharge medications provided to patient and family.

## 2025-02-07 NOTE — OP NOTE
OPERATIVE REPORT - Podiatry  PATIENT NAME: Augustina Shepard    :  1990  MRN: [id number]  Pt Location:  OR ROOM 12    SURGERY DATE: 2025    Surgeons and Role:     * Jevon Ontiveros DPM - Primary     * Casey Saha DPM - Assisting    Pre-op Diagnosis:  Acquired hallux valgus of left foot [M20.12]    Post-Op Diagnosis Codes:     * Acquired hallux valgus of left foot [M20.12]    Procedure(s) (LRB):  BUNIONECTOMY THOMAS (Left)    Specimen(s):  * No specimens in log *    Estimated Blood Loss:   Minimal    Drains:  * No LDAs found *    Anesthesia Type:   General/LMA with 15 ml of 1% Lidocaine and 0.5% Bupivacaine in a 1:1 mixture    Hemostasis:  Ankle tourniquet 250mmHg  Electrocautery    Materials:  Implant Name Type Inv. Item Serial No.  Lot No. LRB No. Used Action   K-WIRE 0.045 X 4 IN PLAIN 2 POINT TYPE A TROCAR - [id number]  K-WIRE 0.045 X 4 IN PLAIN 2 POINT TYPE A TROCAR  [employer]  Left 1 Implanted   SCREW COMP 2.5 X 14MM MICRO FT - [id number]  SCREW COMP 2.5 X 14MM MICRO FT  [employer]  Left 1 Implanted   WIRE 0.86MM TROCAR TIP - [id number]  WIRE 0.86MM TROCAR TIP  [employer]  Left 1 Implanted     3-0 and 4-0 Vicryl suture  4-0 Nylon suture    Operative Findings:  Consistent with pre op diagnosis and imaging    Complications:   None    Procedure and Technique:     Under mild sedation, the patient was brought into the operating room and placed on the operating room table in the supine position. IV sedation was achieved by anesthesia team and a universal timeout was performed where all parties are in agreement of correct patient, correct procedure and correct site. A pneumatic tourniquet was then placed over the patient's left ankle with ample padding. A shi block was performed consisting of 15 ml of 1% Lidocaine and 0.5% Bupivacaine in a 1:1 mixture. The foot was then prepped and draped in the usual aseptic manner. An esmarch bandage was used to exsangunate the foot and the  pneumatic tourniquet was then inflated to 250mmHg.    Attention was then directed to the left foot. A linear longitudinal skin incision was then made with a 15 blade over the dorsal medial 1st MTPJ. Sharp and blunt dissection was then carried through subcutaneous tissues with care taken to protect neurovascular structures and cauterize any bleeders as necessary. An inverted L shaped capsular and periosteal incision was then made and soft tissues were reflected off bone from the distal 1st metatarsal. Dissection was then carried through the same skin incision laterally into the 1st interspace and a 15 blade was used to perform a lateral joint release. The sagittal saw was then used to resect the prominent medial eminence from the 1st metatarsal head. A K wire was placed medially into the metatarsal head to serve as an axis guide at the apex of the planned chevron osteotomy. The cut guide and sagittal saw was then used to make the chevron shaped osteotomy. The capital fragment was then distracted, translated laterally to achieve deformity correction, and impacted onto the proximal shaft. The remaining overhanging shelf of bone resulting from translation of the osteotomy was then resected with the sagittal saw. One Arthrex 2.5mm headless cannulated compression screw was then placed across the osteotomy per  protocol. The site was then irrigated with normal saline. Deep closure was obtained using 3-0 Vicryl suture in interrupted fashion. Subcutaneous closure was obtained using 4-0 Vicryl suture in interrupted fashion. Skin closure was obtained using 4-0 Nylon in interrupted fashion. A post operative block using 0.5% Bupivacaine was administered. The site was dressed with betadine soaked adaptic, 4x4 gauze, kaitlynn, and secured with tape.    The tourniquet was deflated at approximately 44 min and normal hyperemic response was noted to all digits. The patient tolerated the procedure and anesthesia well without  "immediate complications and transferred to PACU with vital signs stable.       Dr. Ontiveros was present during the entire procedure and participated in all key aspects.    SIGNATURE: Casey Saha DPM  DATE: February 7, 2025  TIME: 8:56 AM      Portions of the record may have been created with voice recognition software. Occasional wrong word or \"sound a like\" substitutions may have occurred due to the inherent limitations of voice recognition software. Read the chart carefully and recognize, using context, where substitutions have occurred.    "

## 2025-02-07 NOTE — INTERVAL H&P NOTE
H&P reviewed. After examining the patient I find no changes in the patients condition since the H&P had been written.    Vitals:    02/07/25 0610   BP: 121/85   Pulse: 85   Resp: 16   Temp: 97.5 °F (36.4 °C)   SpO2: 100%

## 2025-02-07 NOTE — DISCHARGE INSTR - AVS FIRST PAGE
[facility] Podiatry  Post-Operative Instructions    1. Take your prescribed medication as directed. You can take ibuprofen in between doses of the narcotic if breakthrough pain occurs  2. Upon arrival at home, lie down and elevate your surgical foot on 2 pillows. Unless you're moving from the couch, bed, or bathroom, make sure to elevate the foot. Swelling is not your friend.   3. Remain quiet, off your feet as much as possible, for the first 24-48 hours. This is when your feet first swell and may become painful. After 48 hours you may begin limited walking following these restrictions:   Weightbear as tolerated to surgical foot, wearing your surgical shoe. Use crutches for assistance as needed.  4. Drink large quantities of water. Consume no alcohol. Continue a well-balanced diet.  5. Report any unusual discomfort or fever to this office.  6. A limited amount of discomfort and swelling is to be expected. In some cases the skin may take on a bruised appearance. The surgical solution that was applied to your foot prior to the operation is dark in color and the operation site may appear to be oozing when it actually is not.  7. A slight amount of blood is to be expected, and is no cause for alarm. Do not remove the dressings. If there is active bleeding and if the bleeding persists, add additional gauze to the bandage, apply direct pressure, elevate your feet and call this office.  8. Do not get the dressings wet. As regular bathing may be inconvenient, sponge baths are recommended. If you shower, keep the dressing dry.   9. When anesthesia wears off and if any discomfort should be present, apply an ice pack directly over the operated area for 15 minute intervals for several hours or until the pain leaves. (USE IN EXCESS OF 15 MINUTES COULD CAUSE FROSTBITE). Do not use hot water bags or electric pads. A convenient icepack can be made by placing ice cubes in a plastic bag and covering this with a towel.  10. If  necessary, take a mild laxative before retiring.  11. Wear your special boot anytime you put weight on your foot, even if it is just to walk to the bathroom and back. It will probably be a few weeks before you will be permitted to try regular shoes.  12. Having performed the operation, we are interested in a prompt recovery. Please cooperate by following the above instructions.  13. Please call to confirm your post-op appointment or call with any other questions.

## 2025-02-07 NOTE — ANESTHESIA PREPROCEDURE EVALUATION
Procedure:  BUNIONECTOMY THOMAS (Left: Foot)    Relevant Problems   CARDIO   (+) Varicose veins of left lower extremity with pain      Behavioral Health   (+) ADD (attention deficit disorder) without hyperactivity       Latest Reference Range & Units 02/07/25 06:15   PREGNANCY TEST URINE Negative  Negative     Physical Exam    Airway    Mallampati score: II  TM Distance: >3 FB  Neck ROM: full     Dental   No notable dental hx     Cardiovascular      Pulmonary      Other Findings  post-pubertal.      Anesthesia Plan  ASA Score- 2     Anesthesia Type- general with ASA Monitors.         Additional Monitors:     Airway Plan: LMA.           Plan Factors-Exercise tolerance (METS): >4 METS.    Chart reviewed.   Existing labs reviewed. Patient summary reviewed.    Patient is not a current smoker.  Patient did not smoke on day of surgery.            Induction- intravenous.    Postoperative Plan- Plan for postoperative opioid use.     Perioperative Resuscitation Plan - Level 1 - Full Code.       Informed Consent- Anesthetic plan and risks discussed with patient.  I personally reviewed this patient with the CRNA. Discussed and agreed on the Anesthesia Plan with the CRNA..      NPO Status:  Vitals Value Taken Time   Date of last liquid 02/06/25 02/07/25 0614   Time of last liquid 2200 02/07/25 0614   Date of last solid 02/06/25 02/07/25 0614   Time of last solid 1900 02/07/25 0614

## 2025-02-07 NOTE — ANESTHESIA POSTPROCEDURE EVALUATION
Post-Op Assessment Note    CV Status:  Stable  Pain Score: 0    Pain management: adequate    Multimodal analgesia used between 6 hours prior to anesthesia start to PACU discharge    Mental Status:  Alert   Hydration Status:  Stable   PONV Controlled:  None   Airway Patency:  Patent  Airway: intubated  There is a medical reason for not screening for obstructive sleep apnea and/or for not using two or more mitigation strategies   Post Op Vitals Reviewed: Yes    No anethesia notable event occurred.    Staff: CRNA           Last Filed PACU Vitals:  Vitals Value Taken Time   Temp 98.6    Pulse 79 02/07/25 0846   /63    Resp 16    SpO2 99 % 02/07/25 0846   Vitals shown include unfiled device data.

## 2025-02-07 NOTE — ANESTHESIA POSTPROCEDURE EVALUATION
Post-Op Assessment Note    CV Status:  Stable    Pain management: adequate       Mental Status:  Alert and awake   Hydration Status:  Euvolemic   PONV Controlled:  Controlled   Airway Patency:  Patent     Post Op Vitals Reviewed: Yes    No anethesia notable event occurred.    Staff: Anesthesiologist           Last Filed PACU Vitals:  Vitals Value Taken Time   Temp 98.6 °F (37 °C) 02/07/25 0845   Pulse 57 02/07/25 0930   /68 02/07/25 0930   Resp 14 02/07/25 0930   SpO2 98 % 02/07/25 0930       Modified Dangelo:     Vitals Value Taken Time   Activity 2 02/07/25 0930   Respiration 2 02/07/25 0930   Circulation 2 02/07/25 0930   Consciousness 2 02/07/25 0930   Oxygen Saturation 2 02/07/25 0930     Modified Dangelo Score: 10

## 2025-02-13 ENCOUNTER — OFFICE VISIT (OUTPATIENT)
Dept: PODIATRY | Facility: CLINIC | Age: 35
End: 2025-02-13

## 2025-02-13 DIAGNOSIS — M20.12 ACQUIRED HALLUX VALGUS OF LEFT FOOT: Primary | ICD-10-CM

## 2025-02-13 PROCEDURE — 99024 POSTOP FOLLOW-UP VISIT: CPT | Performed by: PODIATRIST

## 2025-02-13 NOTE — PROGRESS NOTES
Patient presents 6 days post Amadou bunionectomy left foot.  Minimal discomfort related.  Mild to moderate edema noted.  Patient told to continue with current orders including ice and elevation.  She is rescheduled in 1 week for suture removal.

## 2025-02-20 ENCOUNTER — OFFICE VISIT (OUTPATIENT)
Dept: PODIATRY | Facility: CLINIC | Age: 35
End: 2025-02-20

## 2025-02-20 VITALS — RESPIRATION RATE: 18 BRPM | WEIGHT: 159 LBS | BODY MASS INDEX: 25.55 KG/M2 | HEIGHT: 66 IN

## 2025-02-20 DIAGNOSIS — M20.12 ACQUIRED HALLUX VALGUS OF LEFT FOOT: Primary | ICD-10-CM

## 2025-02-20 PROCEDURE — 99024 POSTOP FOLLOW-UP VISIT: CPT | Performed by: PODIATRIST

## 2025-02-20 NOTE — PROGRESS NOTES
Patient presents 13 days post hallux valgus correction left foot.  Surgical site healing uneventfully.  Minimal if any pain related.  All sutures were removed.  No sign of infection or wound dehiscence.  Patient may now get left foot wet.  She is to stay in surgical shoe for 2 more weeks.  Reappoint 2 weeks

## 2025-02-26 ENCOUNTER — TELEPHONE (OUTPATIENT)
Age: 35
End: 2025-02-26

## 2025-02-26 NOTE — TELEPHONE ENCOUNTER
Caller: Augustina Shepard    Doctor: Dr. Ontiveros    Reason for call: appt was wrong/should be 2 weeks and we did one/called office and they RS    Call back#: NA

## 2025-03-06 ENCOUNTER — OFFICE VISIT (OUTPATIENT)
Dept: PODIATRY | Facility: CLINIC | Age: 35
End: 2025-03-06

## 2025-03-06 DIAGNOSIS — M20.12 ACQUIRED HALLUX VALGUS OF LEFT FOOT: Primary | ICD-10-CM

## 2025-03-06 PROCEDURE — 99024 POSTOP FOLLOW-UP VISIT: CPT | Performed by: PODIATRIST

## 2025-03-06 NOTE — PROGRESS NOTES
Patient presents 4 weeks post Amadou bunionectomy left foot.  Minimal discomfort related.  Mild edema present within normal limits for procedure performed.  Patient may return to a running shoe pain permitted.  Reappoint 3 weeks.

## 2025-04-03 ENCOUNTER — OFFICE VISIT (OUTPATIENT)
Dept: PODIATRY | Facility: CLINIC | Age: 35
End: 2025-04-03

## 2025-04-03 DIAGNOSIS — M20.12 ACQUIRED HALLUX VALGUS OF LEFT FOOT: Primary | ICD-10-CM

## 2025-04-03 PROCEDURE — 99024 POSTOP FOLLOW-UP VISIT: CPT | Performed by: PODIATRIST

## 2025-04-03 NOTE — PROGRESS NOTES
Patient presents approximately 7 weeks post Amadou bunionectomy left foot.  She relates no pain.  She is wearing running shoes without difficulty.  She was told that she may resume wearing any shoe pain permitted.  She should hold off on running and jogging for another 5 weeks.    The patient is interested in having hallux valgus correction with Amadou bunionectomy right foot in October or November.  She will be rescheduled in 3 months to schedule that surgery.

## 2025-04-22 ENCOUNTER — TELEPHONE (OUTPATIENT)
Dept: VASCULAR SURGERY | Facility: CLINIC | Age: 35
End: 2025-04-22

## 2025-04-22 ENCOUNTER — OFFICE VISIT (OUTPATIENT)
Dept: VASCULAR SURGERY | Facility: CLINIC | Age: 35
End: 2025-04-22
Payer: COMMERCIAL

## 2025-04-22 VITALS
DIASTOLIC BLOOD PRESSURE: 76 MMHG | BODY MASS INDEX: 25.74 KG/M2 | OXYGEN SATURATION: 99 % | SYSTOLIC BLOOD PRESSURE: 118 MMHG | WEIGHT: 159.5 LBS | HEART RATE: 83 BPM

## 2025-04-22 DIAGNOSIS — I78.1 SPIDER VEIN, SYMPTOMATIC: ICD-10-CM

## 2025-04-22 DIAGNOSIS — I83.812 VARICOSE VEINS OF LEFT LOWER EXTREMITY WITH PAIN: Primary | ICD-10-CM

## 2025-04-22 PROCEDURE — 99203 OFFICE O/P NEW LOW 30 MIN: CPT | Performed by: NURSE PRACTITIONER

## 2025-04-22 NOTE — ASSESSMENT & PLAN NOTE
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 + Maternal history of varicose veins    Referred to the office for vascular evaluation of veins    -Left medial proximal calf reticular vein previously with discomfort in 2021.  Symptoms resolved with use of below-knee compression stockings  -Mainly complaining of right proximal lateral thigh and right popliteal fossa/distal posterior thigh spider/reticular veins with symptoms of burning discomfort  -We discussed physiology of venous disease, available treatment options and indications for treatment  -Given small caliber/surface spider/reticular veins treatment option would be injection sclerotherapy  -This is deemed cosmetic, not covered by the insurance  -Discussed burning/stinging with injections, risk, permanent hyperpigmentation, need to wear compression stockings after injections and out-of-pocket cost  -She would like to proceed with injection sclerotherapy. Will plan for sclerotherapy in the fall/winter.  -Rx 20-30 waist high compression given for injections  -Continue below the knee compression on a daily basis  -Sclero packet given  -Photos taken  -For scheduling: Asclera 0.5% solution, up to 10 mL, 90 minutes, $500

## 2025-04-22 NOTE — PROGRESS NOTES
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 + Maternal history of varicose veins    Referred to the office for vascular evaluation of veins    -Left medial proximal calf reticular vein previously with discomfort in .  Symptoms resolved with use of below-knee compression stockings  -Mainly complaining of right proximal lateral thigh and right popliteal fossa/distal posterior thigh spider/reticular veins with symptoms of burning discomfort  -We discussed physiology of venous disease, available treatment options and indications for treatment  -Given small caliber/surface spider/reticular veins treatment option would be injection sclerotherapy  -This is deemed cosmetic, not covered by the insurance  -Discussed burning/stinging with injections, risk, permanent hyperpigmentation, need to wear compression stockings after injections and out-of-pocket cost  -She would like to proceed with injection sclerotherapy. Will plan for sclerotherapy in the fall/winter.  -Rx 20-30 waist high compression given for injections  -Continue below the knee compression on a daily basis  -Sclero packet given  -Photos taken  -For scheduling: Asclera 0.5% solution, up to 10 mL, 90 minutes, $500             Chief complaint: New pt here with burning in both legs (R>L). Pt has discolored veins, and minimal swelling at times for both legs. Pt wears OTC compression stockings everyday for work.     History of Present Illness   Augustina Shepard is a 34 y.o. female who presents to the office for evaluation of her veins.  She has scattered bilateral spider/reticular veins.   Previously evaluated by her PCP in 2021 for left medial proximal calf reticular vein discomfort.  She has complete resolution of discomfort with utilizing OTC below-knee compression.  She occasionally has ankle swelling.  She is mainly complaining of right lateral thigh and posterior distal thigh burning discomfort to the area of spider/reticular veins.   No significant symptoms of heaviness, throbbing, aching.  No stasis changes.  No bleeding veins.  She does not have children and does not plan to the future. Maternal history of varicose veins surgical intervention.       History obtained from: patient    Review of Systems   Cardiovascular:  Positive for leg swelling.   Skin:  Positive for color change.     Medical History Reviewed by provider this encounter:  Tobacco  Allergies  Meds  Problems  Med Hx  Surg Hx  Fam Hx     .     Objective   I have reviewed and made appropriate changes to the review of systems input by the medical assistant.    Vitals:    04/22/25 0910   BP: 118/76   BP Location: Left arm   Patient Position: Sitting   Cuff Size: Standard   Pulse: 83   SpO2: 99%   Weight: 72.3 kg (159 lb 8 oz)       Patient Active Problem List   Diagnosis    ADD (attention deficit disorder) without hyperactivity    Varicose veins of left lower extremity with pain    Family history of breast cancer    Dense breasts    Spider vein, symptomatic       Past Surgical History:   Procedure Laterality Date    NO PAST SURGERIES      VT CORRJ HLX VLGS BNCTY SESMDC DSTL METAR OSTEOT Left 2/7/2025    Procedure: BUNIONECTOMY THOMAS;  Surgeon: Jevon Ontiveros DPM;  Location:  MAIN OR;  Service: Podiatry       Family History   Problem Relation Age of Onset    Varicose Veins Mother     Thyroid disease Father     ADD / ADHD Father     No Known Problems Maternal Grandmother     Prostate cancer Maternal Grandfather     Colon cancer Maternal Grandfather 70    Breast cancer Paternal Grandmother 70    Breast cancer Paternal Aunt  35    No Known Problems Paternal Aunt     Breast cancer Cousin 33        Paternal Cousin - no genetic testing known    Breast cancer Cousin 33    No Known Problems Half-Sister        Social History     Socioeconomic History    Marital status: Single     Spouse name: Not on file    Number of children: 0    Years of education: Not on file    Highest education level: Not on file   Occupational History    Not on file   Tobacco Use    Smoking status: Never     Passive exposure: Never    Smokeless tobacco: Never   Vaping Use    Vaping status: Never Used   Substance and Sexual Activity    Alcohol use: Yes     Alcohol/week: 2.0 standard drinks of alcohol     Types: 2 Glasses of wine per week     Comment: socially    Drug use: No    Sexual activity: Yes     Partners: Male     Birth control/protection: None   Other Topics Concern    Not on file   Social History Narrative    Exercise habits     Social Drivers of Health     Financial Resource Strain: Not on file   Food Insecurity: Not on file   Transportation Needs: Not on file   Physical Activity: Not on file   Stress: Not on file   Social Connections: Not on file   Intimate Partner Violence: Not on file   Housing Stability: Not on file       Allergies   Allergen Reactions    Penicillins      Reaction Date: 02Sep2008;     Sulfa Antibiotics      Reaction Date: 02Sep2008;          Current Outpatient Medications:     naproxen (NAPROSYN) 500 mg tablet, Take 1 tablet (500 mg total) by mouth 2 (two) times a day as needed for mild pain, Disp: 28 tablet, Rfl: 1    /76 (BP Location: Left arm, Patient Position: Sitting, Cuff Size: Standard)   Pulse 83   Wt 72.3 kg (159 lb 8 oz)   SpO2 99%   BMI 25.74 kg/m²      Physical Exam  Vitals and nursing note reviewed.   Constitutional:       Appearance: Normal appearance.   HENT:      Head: Normocephalic and atraumatic.   Eyes:      Extraocular Movements: Extraocular movements intact.   Cardiovascular:      Pulses: Normal pulses.       Heart sounds: Normal heart sounds.   Pulmonary:      Effort: Pulmonary effort is normal.   Musculoskeletal:         General: No swelling.   Skin:     General: Skin is warm.   Neurological:      General: No focal deficit present.      Mental Status: She is alert and oriented to person, place, and time.   Psychiatric:         Mood and Affect: Mood normal.         Behavior: Behavior normal.         Administrative Statements   I have spent a total time of 15 minutes in caring for this patient on the day of the visit/encounter including Prognosis, Instructions for management, Importance of tx compliance, Impressions, and Documenting in the medical record.

## 2025-05-22 ENCOUNTER — TELEPHONE (OUTPATIENT)
Age: 35
End: 2025-05-22

## 2025-05-22 NOTE — TELEPHONE ENCOUNTER
Patient called in to schedule New Patient Appointment.    Scheduled 6/25/2025 at 12:30 with Vicky in [place].

## 2025-05-23 ENCOUNTER — APPOINTMENT (OUTPATIENT)
Dept: MAMMOGRAPHY | Facility: HOSPITAL | Age: 35
End: 2025-05-23
Attending: FAMILY MEDICINE
Payer: COMMERCIAL

## 2025-05-27 ENCOUNTER — ANNUAL EXAM (OUTPATIENT)
Dept: OBGYN CLINIC | Facility: CLINIC | Age: 35
End: 2025-05-27
Payer: COMMERCIAL

## 2025-05-27 VITALS
WEIGHT: 156 LBS | BODY MASS INDEX: 25.07 KG/M2 | HEIGHT: 66 IN | DIASTOLIC BLOOD PRESSURE: 64 MMHG | SYSTOLIC BLOOD PRESSURE: 100 MMHG

## 2025-05-27 DIAGNOSIS — Z12.39 BREAST CANCER SCREENING, HIGH RISK PATIENT: ICD-10-CM

## 2025-05-27 DIAGNOSIS — Z11.51 SCREENING FOR HPV (HUMAN PAPILLOMAVIRUS): ICD-10-CM

## 2025-05-27 DIAGNOSIS — Z12.4 ENCOUNTER FOR SCREENING FOR MALIGNANT NEOPLASM OF CERVIX: ICD-10-CM

## 2025-05-27 DIAGNOSIS — Z01.419 WELL WOMAN EXAM WITH ROUTINE GYNECOLOGICAL EXAM: Primary | ICD-10-CM

## 2025-05-27 PROCEDURE — G0145 SCR C/V CYTO,THINLAYER,RESCR: HCPCS | Performed by: OBSTETRICS & GYNECOLOGY

## 2025-05-27 PROCEDURE — G0476 HPV COMBO ASSAY CA SCREEN: HCPCS | Performed by: OBSTETRICS & GYNECOLOGY

## 2025-05-27 PROCEDURE — S0612 ANNUAL GYNECOLOGICAL EXAMINA: HCPCS | Performed by: OBSTETRICS & GYNECOLOGY

## 2025-05-27 NOTE — PROGRESS NOTES
Annual Wellness Visit  Name: Augustina Shepard      : 1990      MRN: [id number]  Encounter Provider: Teresa Salvador DO  Encounter Date: 2025   Encounter department: [facility]    34 y.o.  yo presents today for her annual exam.:  Assessment & Plan  Well woman exam with routine gynecological exam    Orders:    Liquid-based pap, screening    Encounter for screening for malignant neoplasm of cervix    Orders:    Liquid-based pap, screening    Screening for HPV (human papillomavirus)    Orders:    Liquid-based pap, screening    Breast cancer screening, high risk patient    Orders:    US breast screening bilateral complete (ABUS); Future             History of Present Illness     Augustina Shepard is a 34 y.o. female who presents for annual well woman exam.    GYN:  no vaginal discharge, labial erythema or lesions, dyspareunia.  Menses are regular, q 28-30 days, lasting 3-4 days. It used to come on the , now comes the beginning of the month,   Contraception: none.  Patient is sexually active with her longstanding partner.  no gynecologic surgeries.  Gardasil no     OB:   female    :  No dysuria, urinary frequency or urgency.  No hematuria, flank pain, incontinence.  No constipation, diarrhea    Breast:  No breast mass, skin changes, dimpling, reddening, nipple retraction.  No breast discharge.  Patient does not have a family history of endometrial, or ovarian ca.   Strong famiyl h /o breast ca  Negative gene testing   Family History[1]      General:  Diet: Reviewed dietary calcium recommendations  Exercise: Reviewed recommendation of 150 minutes of moderate intensity exercise per week  ETOH use: social/minimal  Tobacco use: no  Recreational drug use: no    Screening:  Cervical cancer: last pap smear in . Results were  NILM, neg HPV.  Breast cancer: last mammogram in , mammo scheduled , ABUS ordered.   Colon cancer: not indicated   STD screening:  "declined.  Depression screening: negative     Review of Systems as per [facility]    Medical History Reviewed by provider this encounter:  Tobacco  Allergies  Meds  Problems  Med Hx  Surg Hx  Fam Hx     .     Objective   /64   Ht 5' 6\" (1.676 m)   Wt 70.8 kg (156 lb)   LMP 05/07/2025 (Exact Date)   BMI 25.18 kg/m²        Physical Exam  Constitutional:       General: She is awake.      Appearance: Normal appearance. She is well-developed.   Genitourinary:      Vulva, bladder and urethral meatus normal.      Right Labia: No rash, tenderness or lesions.     Left Labia: No tenderness, lesions or rash.     No labial fusion noted.      No vaginal discharge, erythema, tenderness or bleeding.      No vaginal prolapse present.     No vaginal atrophy present.       Right Adnexa: not tender, not full and no mass present.     Left Adnexa: not tender, not full and no mass present.     Cervix is nulliparous.      No cervical motion tenderness, discharge, lesion or polyp.      Uterus is not enlarged, fixed, tender or irregular.      No uterine mass detected.     Uterus is anteverted.      No urethral prolapse present.      Bladder is not tender.       Pelvic exam was performed with patient in the lithotomy position.   Breasts:     Right: No inverted nipple, mass, nipple discharge, skin change or tenderness.      Left: No inverted nipple, mass, nipple discharge, skin change or tenderness.   HENT:      Head: Normocephalic and atraumatic.     Cardiovascular:      Rate and Rhythm: Normal rate and regular rhythm.      Heart sounds: Normal heart sounds.   Pulmonary:      Effort: Pulmonary effort is normal. No tachypnea or respiratory distress.      Breath sounds: Normal breath sounds.   Abdominal:      General: Abdomen is flat. There is no distension.      Palpations: Abdomen is soft.      Tenderness: There is no abdominal tenderness. There is no guarding or rebound.     Musculoskeletal:      Cervical back: Neck supple. "   Lymphadenopathy:      Upper Body:      Right upper body: No supraclavicular or axillary adenopathy.      Left upper body: No supraclavicular or axillary adenopathy.     Neurological:      General: No focal deficit present.      Mental Status: She is alert and oriented to person, place, and time.     Psychiatric:         Mood and Affect: Mood normal.         Behavior: Behavior normal.         Thought Content: Thought content normal.         Judgment: Judgment normal.   Vitals reviewed.                                      [1]   Family History  Problem Relation Name Age of Onset    Varicose Veins Mother Mother     Deep vein thrombosis Mother Mother     Thyroid disease Father Father     ADD / ADHD Father Father     No Known Problems Maternal Grandmother      Prostate cancer Maternal Grandfather Adrien     Colon cancer Maternal Grandfather Adrien 70    Breast cancer Paternal Grandmother Grandmother 70    Breast cancer Paternal Aunt  35    No Known Problems Paternal Aunt      Breast cancer Cousin  33        Paternal Cousin - no genetic testing known    Breast cancer Cousin  33    No Known Problems Half-Sister

## 2025-05-30 ENCOUNTER — RESULTS FOLLOW-UP (OUTPATIENT)
Dept: OBGYN CLINIC | Facility: CLINIC | Age: 35
End: 2025-05-30

## 2025-05-30 LAB
LAB AP GYN PRIMARY INTERPRETATION: NORMAL
Lab: NORMAL

## 2025-06-24 ENCOUNTER — TELEPHONE (OUTPATIENT)
Dept: NEUROLOGY | Facility: CLINIC | Age: 35
End: 2025-06-24

## 2025-06-25 ENCOUNTER — OFFICE VISIT (OUTPATIENT)
Age: 35
End: 2025-06-25
Payer: COMMERCIAL

## 2025-06-25 VITALS
HEIGHT: 66 IN | SYSTOLIC BLOOD PRESSURE: 110 MMHG | OXYGEN SATURATION: 99 % | HEART RATE: 68 BPM | DIASTOLIC BLOOD PRESSURE: 80 MMHG | BODY MASS INDEX: 25.23 KG/M2 | WEIGHT: 157 LBS

## 2025-06-25 DIAGNOSIS — R20.2 PARESTHESIA: ICD-10-CM

## 2025-06-25 DIAGNOSIS — M54.16 RADICULOPATHY, LUMBAR REGION: Primary | ICD-10-CM

## 2025-06-25 PROCEDURE — 99204 OFFICE O/P NEW MOD 45 MIN: CPT

## 2025-06-25 RX ORDER — CELECOXIB 50 MG/1
50 CAPSULE ORAL 2 TIMES DAILY
Qty: 90 CAPSULE | Refills: 0 | Status: SHIPPED | OUTPATIENT
Start: 2025-06-25

## 2025-06-25 NOTE — PATIENT INSTRUCTIONS
EMG/NCS  Your provider has ordered an EMG test for you. Someone should be reaching out shortly to help you schedule this. Below is some information to help prepare you for this test  WHAT YOU NEED TO KNOW:   This test has 2 parts. The first part is called a nerve conduction study (NCS). During this part, a technician or physician will place electrodes (stickers that read electrical signals) on your skin and will give you electrical stimulations using a small probe to see how your nerves are working. The second part, the EMG (electromyography), is done by a physician who will put a very small needle into one muscle at a time to look at the activity of this muscle which will show your muscle health, peripheral nerve health, and even looks at nerve function all the way up to your neck and/or back.   Below is a link to a video that shows you what to expect on test day.        PRE-APPOINTMENT INSTRUCTIONS:   Please CONTINUE ALL your regular medications including blood thinners and diuretics.  Do not stop any medications unless explicitly directed to by your neurologist for the purposes of a specific subtype of EMG/NCS testing for neuromuscular junction diseases (see Frequently Asked Questions below)  Please do not put on any lotions or creams the day of the exam as this may interfere with the test.  This appointment usually takes 45-60 minutes.    POST-APPOINTMENT INSTRUCTIONS:   Any discomfort or muscle fatigue should resolve within an hour of testing.  You may take Tylenol or Non-steroidal Anti-inflammatory drugs (ex: Ibuprofen, Naproxen) if needed.  Recommend avoiding strenuous activity or heavy lifting for the rest of the day if possible.       EMG FREQUENTLY ASKED QUESTIONS:    What is an EMG test?  This test has 2 parts. The first part is called a nerve conduction study. During the first part, the technician will put electrodes on your skin to test your nerves. S/he will give you electrical stimulations to see  how your nerves are working. It feels similar to when you walk across carpet and then touch something metal. Some stimulations are very mild and some are stronger.  The second part, the EMG, is done by a doctor. The doctor will put a fine needle into several muscles to look at the activity.  What does EMG stand for?  Electromyography, the study of muscles.  What does this test look for?  Disorders of the nerves and muscles. Some diagnoses include carpal tunnel syndrome, ulnar neuropathy, peripheral neuropathy, radiculopathy, myopathy, myasthenia gravis and ALS.  Why was I told not to wear lotion or body oil for the test?  The technician will put sticker electrodes on your skin. If you are wearing lotion or body oil, the stickers will not stick properly. The moisturizers can also interfere with the test readings.  How long will this test take?  This test can take 30-45 minutes if one limb is being evaluated, or 45-60 minutes if 2 limbs are being evaluated. This is just an estimate. Some tests can take longer, particularly when complex or with advanced pathology, and some will take less time.  Can I eat and drink before this test?  Yes  Can I take all of my medications before the test?  Yes, you can take all of your medications, unless your doctor specifically told you not to take something before the test.  Can I take pain medication before the test?  Yes  Can I take blood thinners if I am getting an EMG?  Yes, you should take all of your regular medications, unless your doctor specifically told you not to take something before the test. If you are on blood thinners, please inform the technician and the doctor performing your test.  Are there are any medications that I cannot take before this test?  You can take all of your regular medications, unless your doctor specifically told you not to take something.  If you are having this test to check for myasthenia gravis, your doctor might tell you to not take  pyridostigmine (Mestinon) on the day of the test, until the test is complete. If your doctor is one of the doctors performing the test, you may need to ask them specifically if you should hold this medicine. If your doctor is outside of [facility], please contact your doctor regarding instructions.  Can I have this test if I have a pacemaker or defibrillator?  As long as there are no external wires from your cardiac device (meaning outside of the skin), you can have this test. Please let the technician and doctor know if you have a cardiac device.  Can I have this test if I have an external defibrillator or LVAD (left ventricular assist device)?  No. Please contact your ordering provider for next steps.  Can I drive after the test?  Yes  How long will it take for my doctor to receive the results?  24-48 hours    Other instructions:  - Referral to physical therapy  - If no improvement in 6 weeks then will order MRI Lumbar spine  - Complete labs to rule out other causes for numbness/tingling  - Start Celebrex 50 mg twice daily x 6 weeks  - Complete EMG testing   - Follow up in 7 weeks; depending on findings will refer you to neurosurgery vs pain management

## 2025-06-25 NOTE — ASSESSMENT & PLAN NOTE
Patient with low back pain over the last 11 years with associated paresthesias bilaterally (R>L) in the S1-S2 dermatome.  She denies any red flags today and her neurologic exam is intact and non focal.  She is not hyper-reflexic and does not appear to be weak or myelopathic   She has had some relief with chiropractic manipulation and naproxen prn.  As such I have recommended a formal course of physical therapy as well as Celebrex 50 mg bid x 6 weeks.  She was advised not to use Naproxen with Celebrex.  She will also complete a bilateral lower extremity EMG to confirm likely lumbar radiculopathy, as well as serum work up to rule out other causes for neuropathy.  If her pain is not improved despite 6 weeks of physical therapy then we will proceed with MRI Lumbar spine.   Pending results I will refer her to neurosurgery vs pain management for long term management of chronic low back pain.    Orders:    celecoxib (CeleBREX) 50 MG capsule; Take 1 capsule (50 mg total) by mouth 2 (two) times a day X 6 weeks    Ambulatory referral to Physical Therapy; Future    EMG 2 limb lower extremity; Future

## 2025-06-25 NOTE — PROGRESS NOTES
Name: Augustina Shepard      : 1990      MRN: [id number]  Encounter Provider: ELBA Marie  Encounter Date: 2025   Encounter department: [facility]  :  Assessment & Plan  Radiculopathy, lumbar region  Patient with low back pain over the last 11 years with associated paresthesias bilaterally (R>L) in the S1-S2 dermatome.  She denies any red flags today and her neurologic exam is intact and non focal.  She is not hyper-reflexic and does not appear to be weak or myelopathic   She has had some relief with chiropractic manipulation and naproxen prn.  As such I have recommended a formal course of physical therapy as well as Celebrex 50 mg bid x 6 weeks.  She was advised not to use Naproxen with Celebrex.  She will also complete a bilateral lower extremity EMG to confirm likely lumbar radiculopathy, as well as serum work up to rule out other causes for neuropathy.  If her pain is not improved despite 6 weeks of physical therapy then we will proceed with MRI Lumbar spine.   Pending results I will refer her to neurosurgery vs pain management for long term management of chronic low back pain.    Orders:    celecoxib (CeleBREX) 50 MG capsule; Take 1 capsule (50 mg total) by mouth 2 (two) times a day X 6 weeks    Ambulatory referral to Physical Therapy; Future    EMG 2 limb lower extremity; Future    Paresthesia  Patient reports paresthesias bilaterally (R>L) in the S1-S2 dermatome, associated with low back pain.  This occurs intermittently and is improved to some degree with chiropractic manipulation and naproxen prn.  We discussed proceeding with EMG to confirm likely lumbar radiculopathy, as well as serum work up to rule out other causes for neuropathy.    Orders:    EMG 2 limb lower extremity; Future    Vitamin B6; Future    Vitamin B12; Future    Vitamin B1, whole blood; Future    Sedimentation rate, automated; Future    C-reactive protein; Future    Deaminated Gliadin  Antibody, IgA; Future    Methylmalonic acid, serum; Future    TSH, 3rd generation with Free T4 reflex; Future    Homocysteine; Future      Patient Instructions     EMG/NCS  Your provider has ordered an EMG test for you. Someone should be reaching out shortly to help you schedule this. Below is some information to help prepare you for this test  WHAT YOU NEED TO KNOW:   This test has 2 parts. The first part is called a nerve conduction study (NCS). During this part, a technician or physician will place electrodes (stickers that read electrical signals) on your skin and will give you electrical stimulations using a small probe to see how your nerves are working. The second part, the EMG (electromyography), is done by a physician who will put a very small needle into one muscle at a time to look at the activity of this muscle which will show your muscle health, peripheral nerve health, and even looks at nerve function all the way up to your neck and/or back.   Below is a link to a video that shows you what to expect on test day.        PRE-APPOINTMENT INSTRUCTIONS:   Please CONTINUE ALL your regular medications including blood thinners and diuretics.  Do not stop any medications unless explicitly directed to by your neurologist for the purposes of a specific subtype of EMG/NCS testing for neuromuscular junction diseases (see Frequently Asked Questions below)  Please do not put on any lotions or creams the day of the exam as this may interfere with the test.  This appointment usually takes 45-60 minutes.    POST-APPOINTMENT INSTRUCTIONS:   Any discomfort or muscle fatigue should resolve within an hour of testing.  You may take Tylenol or Non-steroidal Anti-inflammatory drugs (ex: Ibuprofen, Naproxen) if needed.  Recommend avoiding strenuous activity or heavy lifting for the rest of the day if possible.       EMG FREQUENTLY ASKED QUESTIONS:    What is an EMG test?  This test has 2 parts. The first part is called a nerve  conduction study. During the first part, the technician will put electrodes on your skin to test your nerves. S/he will give you electrical stimulations to see how your nerves are working. It feels similar to when you walk across carpet and then touch something metal. Some stimulations are very mild and some are stronger.  The second part, the EMG, is done by a doctor. The doctor will put a fine needle into several muscles to look at the activity.  What does EMG stand for?  Electromyography, the study of muscles.  What does this test look for?  Disorders of the nerves and muscles. Some diagnoses include carpal tunnel syndrome, ulnar neuropathy, peripheral neuropathy, radiculopathy, myopathy, myasthenia gravis and ALS.  Why was I told not to wear lotion or body oil for the test?  The technician will put sticker electrodes on your skin. If you are wearing lotion or body oil, the stickers will not stick properly. The moisturizers can also interfere with the test readings.  How long will this test take?  This test can take 30-45 minutes if one limb is being evaluated, or 45-60 minutes if 2 limbs are being evaluated. This is just an estimate. Some tests can take longer, particularly when complex or with advanced pathology, and some will take less time.  Can I eat and drink before this test?  Yes  Can I take all of my medications before the test?  Yes, you can take all of your medications, unless your doctor specifically told you not to take something before the test.  Can I take pain medication before the test?  Yes  Can I take blood thinners if I am getting an EMG?  Yes, you should take all of your regular medications, unless your doctor specifically told you not to take something before the test. If you are on blood thinners, please inform the technician and the doctor performing your test.  Are there are any medications that I cannot take before this test?  You can take all of your regular medications, unless your  doctor specifically told you not to take something.  If you are having this test to check for myasthenia gravis, your doctor might tell you to not take pyridostigmine (Mestinon) on the day of the test, until the test is complete. If your doctor is one of the doctors performing the test, you may need to ask them specifically if you should hold this medicine. If your doctor is outside of [facility], please contact your doctor regarding instructions.  Can I have this test if I have a pacemaker or defibrillator?  As long as there are no external wires from your cardiac device (meaning outside of the skin), you can have this test. Please let the technician and doctor know if you have a cardiac device.  Can I have this test if I have an external defibrillator or LVAD (left ventricular assist device)?  No. Please contact your ordering provider for next steps.  Can I drive after the test?  Yes  How long will it take for my doctor to receive the results?  24-48 hours    Other instructions:  - Referral to physical therapy  - If no improvement in 6 weeks then will order MRI Lumbar spine  - Complete labs to rule out other causes for numbness/tingling  - Start Celebrex 50 mg twice daily x 6 weeks  - Complete EMG testing   - Follow up in 7 weeks; depending on findings will refer you to neurosurgery vs pain management      History of Present Illness     HPI Augustina Shepard is a 34 year old female with a pmhx of dense breasts, ADD, and varicose veins who presents to the office as a self referral for chronic low back pain.    She reports she has had low back pain for several years, first noticed around 23 years of age.  She denies trauma or injury.  She reports it is usually more right sided than left, but noted on both sides.  She states pain level at baseline is 6/10, when exacerbated 8/10.  She states her pain is exacerbated at least once a month, duration of these episodes are a few days long.   She has seen a chiropractor  intermittently x 4 years, with only temporarily relief of pain.  The last time she saw the chiropractor this did not help.    She reports associated numbness and tingling, down her glute and into the back of her legs ending behind the knee. R>L sided.   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  She has had xrays of her low back 11/17/2021: Alignment is normal. Vertebral body heights are well maintained. Intervertebral   disc spaces are normal. No acute fracture. No significant paravertebral abnormality.   She has never had MRI imaging of her back  She has never attended physical therapy for her back  She does do stretching at home.  She takes Naproxen 500 mg as needed (reports rare use)  She does not like to take this often, and prefers to work through pain by stretching etc.  She has never seen a pain or orthopedic specialist for her low back  She has mentioned low back pain to her PCP in the past; they prescribed naproxen as needed.   She denies any weakness in her legs, imbalance, falls, or changes in bowel or bladder.   She denies any other associated symptoms.     Review of Systems   Constitutional:  Negative for appetite change, fatigue and fever.   HENT: Negative.  Negative for hearing loss, tinnitus, trouble swallowing and voice change.    Eyes: Negative.  Negative for photophobia, pain and visual disturbance.   Respiratory: Negative.  Negative for shortness of breath.    Cardiovascular: Negative.  Negative for palpitations.   Gastrointestinal: Negative.  Negative for nausea and vomiting.   Endocrine: Negative.  Negative for cold intolerance.   Genitourinary: Negative.  Negative for dysuria, frequency and urgency.   Musculoskeletal:  Positive for back pain. Negative for gait problem, myalgias, neck pain and neck stiffness.   Skin: Negative.  Negative for rash.   Allergic/Immunologic: Negative.    Neurological:  Negative for dizziness,  "tremors, seizures, syncope, facial asymmetry, speech difficulty, weakness, light-headedness, numbness and headaches.   Hematological: Negative.  Does not bruise/bleed easily.   Psychiatric/Behavioral: Negative.  Negative for confusion, hallucinations and sleep disturbance.    All other systems reviewed and are negative.    I have personally reviewed the MA's review of systems and made changes as necessary.    Medications Ordered Prior to Encounter[1]      Objective   /80 (BP Location: Right arm, Patient Position: Sitting, Cuff Size: Standard)   Pulse 68   Ht 5' 6\" (1.676 m)   Wt 71.2 kg (157 lb)   LMP 05/07/2025 (Exact Date)   SpO2 99%   BMI 25.34 kg/m²     Physical Exam  Vitals reviewed.   Constitutional:       Appearance: Normal appearance. She is not toxic-appearing or diaphoretic.   HENT:      Head: Normocephalic and atraumatic.      Nose: Nose normal.      Mouth/Throat:      Mouth: Mucous membranes are moist.      Pharynx: Oropharynx is clear.     Eyes:      Extraocular Movements: Extraocular movements intact.      Conjunctiva/sclera: Conjunctivae normal.      Pupils: Pupils are equal, round, and reactive to light.       Cardiovascular:      Rate and Rhythm: Normal rate.      Pulses: Normal pulses.   Pulmonary:      Effort: Pulmonary effort is normal.     Musculoskeletal:         General: Normal range of motion.      Cervical back: Normal range of motion and neck supple.      Right lower leg: No edema.      Left lower leg: No edema.     Skin:     General: Skin is warm.     Neurological:      General: No focal deficit present.      Mental Status: She is alert and oriented to person, place, and time.      Cranial Nerves: No cranial nerve deficit.      Sensory: No sensory deficit.      Motor: No weakness.      Coordination: Coordination normal.      Gait: Gait normal.      Deep Tendon Reflexes: Reflexes normal.     Psychiatric:         Mood and Affect: Mood normal.         Behavior: Behavior normal.    "      Thought Content: Thought content normal.         Judgment: Judgment normal.       Neurological Exam  On neurological examination patient is alert, awake, oriented and in no distress. Speech is fluent without dysarthria or aphasia. Cranial nerves 2-12 were symmetrically intact bilaterally. No evidence of any focal weakness or sensory loss in the upper or lower extremities. Motor testing reveals 5/5 strength of the bilateral upper and lower extremities.There was no pronator drift.  No fasciculations present. No abnormal involuntary movements. Finger- to-nose reveals no tremor or ataxia and intact proprioceptive function, no dysmetria was noted. Rapid alternating movement normal. Sensation was intact to vibration, light touch, and temperature in bilateral upper and lower extremities. Deep tendon reflexes were 2+ and symmetric in the bilateral upper and lower extremities. She is able to rise easily without assistance from a seated position. Casual gait is normal including stance, stride, and arm swing. Normal tandem gait. Romberg is absent.    Administrative Statements   I have spent a total time of 30 minutes in caring for this patient on the day of the visit/encounter including Diagnostic results, Prognosis, Risks and benefits of tx options, Instructions for management, Patient and family education, Importance of tx compliance, Risk factor reductions, Impressions, Counseling / Coordination of care, Documenting in the medical record, Reviewing/placing orders in the medical record (including tests, medications, and/or procedures), and Obtaining or reviewing history  .         [1]   Current Outpatient Medications on File Prior to Visit   Medication Sig Dispense Refill    naproxen (NAPROSYN) 500 mg tablet Take 1 tablet (500 mg total) by mouth 2 (two) times a day as needed for mild pain 28 tablet 1     No current facility-administered medications on file prior to visit.

## 2025-06-25 NOTE — ASSESSMENT & PLAN NOTE
Patient reports paresthesias bilaterally (R>L) in the S1-S2 dermatome, associated with low back pain.  This occurs intermittently and is improved to some degree with chiropractic manipulation and naproxen prn.  We discussed proceeding with EMG to confirm likely lumbar radiculopathy, as well as serum work up to rule out other causes for neuropathy.    Orders:    EMG 2 limb lower extremity; Future    Vitamin B6; Future    Vitamin B12; Future    Vitamin B1, whole blood; Future    Sedimentation rate, automated; Future    C-reactive protein; Future    Deaminated Gliadin Antibody, IgA; Future    Methylmalonic acid, serum; Future    TSH, 3rd generation with Free T4 reflex; Future    Homocysteine; Future

## 2025-07-24 ENCOUNTER — OFFICE VISIT (OUTPATIENT)
Dept: PODIATRY | Facility: CLINIC | Age: 35
End: 2025-07-24
Payer: COMMERCIAL

## 2025-07-24 VITALS — BODY MASS INDEX: 24.11 KG/M2 | WEIGHT: 150 LBS | RESPIRATION RATE: 18 BRPM | HEIGHT: 66 IN

## 2025-07-24 DIAGNOSIS — M20.11 ACQUIRED HALLUX VALGUS OF RIGHT FOOT: Primary | ICD-10-CM

## 2025-07-24 PROCEDURE — 99213 OFFICE O/P EST LOW 20 MIN: CPT | Performed by: PODIATRIST

## 2025-07-24 RX ORDER — SODIUM CHLORIDE, SODIUM LACTATE, POTASSIUM CHLORIDE, CALCIUM CHLORIDE 600; 310; 30; 20 MG/100ML; MG/100ML; MG/100ML; MG/100ML
20 INJECTION, SOLUTION INTRAVENOUS CONTINUOUS
OUTPATIENT
Start: 2025-07-24

## 2025-07-24 NOTE — PROGRESS NOTES
"Name: Augustina Shepard      : 1990      MRN: [id number]  Encounter Provider: Jevon Ontiveros DPM  Encounter Date: 2025   Encounter department: [facility] [place]    Reviewed surgical course for an Thomas bunionectomy of the right foot.  Patient desires the surgery on 2025 at [facility].  Procedures were explained including pre and postoperative course.  Patient will be seen in 4 months for surgical consent signing.  :  Assessment & Plan  Acquired hallux valgus of right foot  Surgical scheduling  Orders:    Case request operating room: BUNIONECTOMY THOMAS; Standing        History of Present Illness   HPI  Augustina Shepard is a 34 y.o. female who presents with a painful bunion deformity affecting the right foot.  Patient had a similar problem affecting the left foot that was treated with Thomas bunionectomy months back successfully.  Noted left foot discomfort related.  Patient is interested in Thomas bunionectomy right foot at [facility] on 2025.      Review of Systems       Objective   Resp 18   Ht 5' 6\" (1.676 m)   Wt 68 kg (150 lb)   BMI 24.21 kg/m²      Physical Exam  Constitutional:       Appearance: Normal appearance.     Cardiovascular:      Pulses: Normal pulses.     Musculoskeletal:         General: Deformity present.      Comments: Hallux valgus deformity right foot.  Inflamed medial eminence first metatarsal head right foot.  Good range of motion right first MPJ.     Skin:     General: Skin is warm.     Neurological:      General: No focal deficit present.      Mental Status: She is oriented to person, place, and time.               "

## 2025-07-25 DIAGNOSIS — M54.16 RADICULOPATHY, LUMBAR REGION: ICD-10-CM

## 2025-07-25 RX ORDER — CELECOXIB 50 MG/1
50 CAPSULE ORAL 2 TIMES DAILY
Qty: 60 CAPSULE | Refills: 1 | OUTPATIENT
Start: 2025-07-25

## 2025-08-08 ENCOUNTER — HOSPITAL ENCOUNTER (OUTPATIENT)
Dept: MAMMOGRAPHY | Facility: HOSPITAL | Age: 35
Discharge: HOME | End: 2025-08-08
Attending: FAMILY MEDICINE
Payer: COMMERCIAL

## 2025-08-08 VITALS — HEIGHT: 66 IN | WEIGHT: 150 LBS | BODY MASS INDEX: 24.11 KG/M2

## 2025-08-08 DIAGNOSIS — R92.30 DENSE BREASTS: ICD-10-CM

## 2025-08-08 DIAGNOSIS — Z80.3 FAMILY HISTORY OF BREAST CANCER: ICD-10-CM

## 2025-08-08 PROCEDURE — 77063 BREAST TOMOSYNTHESIS BI: CPT

## 2025-08-08 PROCEDURE — 77067 SCR MAMMO BI INCL CAD: CPT

## (undated) DEVICE — GAUZE SPONGES,16 PLY: Brand: CURITY

## (undated) DEVICE — DRILL BIT 2MM CALIBRATED

## (undated) DEVICE — NEEDLE BLUNT 18 G X 1 1/2IN

## (undated) DEVICE — SYRINGE 5ML LL

## (undated) DEVICE — STERILE POLYISOPRENE POWDER-FREE SURGICAL GLOVES: Brand: PROTEXIS

## (undated) DEVICE — SUT VICRYL 4-0 PS-2 18 IN J496G

## (undated) DEVICE — DISPOSABLE OR TOWEL: Brand: CARDINAL HEALTH

## (undated) DEVICE — K-WIRE COVERS, STERILE, WHITE, .043-INCH, 1.1MM, 2 PER PACKAGE: Brand: KEY SURGICAL K-WIRE AND PIN COVERS

## (undated) DEVICE — NEPTUNE E-SEP SMOKE EVACUATION PENCIL, COATED, 70MM BLADE, PUSH BUTTON SWITCH: Brand: NEPTUNE E-SEP

## (undated) DEVICE — GLOVE PI ULTRA TOUCH SZ.8.0

## (undated) DEVICE — POV-IOD SOLUTION 4OZ BT

## (undated) DEVICE — CUFF TOURNIQUET 18 X 4 IN QUICK CONNECT DISP 1 BLADDER

## (undated) DEVICE — NEEDLE 25G X 1 1/2

## (undated) DEVICE — 22.5 MM X 6.9 MM X 0.53 MM SAGITTAL BLADE

## (undated) DEVICE — ANTIBACTERIAL UNDYED BRAIDED (POLYGLACTIN 910), SYNTHETIC ABSORBABLE SUTURE: Brand: COATED VICRYL

## (undated) DEVICE — SCD SEQUENTIAL COMPRESSION COMFORT SLEEVE MEDIUM KNEE LENGTH: Brand: KENDALL SCD

## (undated) DEVICE — INTENDED FOR TISSUE SEPARATION, AND OTHER PROCEDURES THAT REQUIRE A SHARP SURGICAL BLADE TO PUNCTURE OR CUT.: Brand: BARD-PARKER ® SAFETYLOCK CARBON RIB-BACK BLADES

## (undated) DEVICE — CHLORAPREP HI-LITE 26ML ORANGE

## (undated) DEVICE — STOCKINETTE 2P PREROLLD 6X60

## (undated) DEVICE — SINGLE PORT MANIFOLD: Brand: NEPTUNE 2

## (undated) DEVICE — CURITY STRETCH BANDAGE: Brand: CURITY

## (undated) DEVICE — GLOVE INDICATOR PI UNDERGLOVE SZ 8 BLUE

## (undated) DEVICE — CURITY NON-ADHERENT STRIPS: Brand: CURITY

## (undated) DEVICE — SYRINGE 10ML LL

## (undated) DEVICE — SUT ETHILON 4-0 PS-2 18 IN 1667H

## (undated) DEVICE — BETHLEHEM UNIVERSAL  MIONR EXT: Brand: CARDINAL HEALTH

## (undated) DEVICE — SURGICAL GOWN, XL SMARTSLEEVE: Brand: CONVERTORS